# Patient Record
Sex: MALE | Race: NATIVE HAWAIIAN OR OTHER PACIFIC ISLANDER | NOT HISPANIC OR LATINO | Employment: FULL TIME | URBAN - METROPOLITAN AREA
[De-identification: names, ages, dates, MRNs, and addresses within clinical notes are randomized per-mention and may not be internally consistent; named-entity substitution may affect disease eponyms.]

---

## 2023-06-27 ENCOUNTER — OCCUPATIONAL MEDICINE (OUTPATIENT)
Dept: URGENT CARE | Facility: PHYSICIAN GROUP | Age: 28
End: 2023-06-27
Payer: COMMERCIAL

## 2023-06-27 ENCOUNTER — APPOINTMENT (OUTPATIENT)
Dept: RADIOLOGY | Facility: IMAGING CENTER | Age: 28
End: 2023-06-27
Attending: PHYSICIAN ASSISTANT
Payer: COMMERCIAL

## 2023-06-27 VITALS
BODY MASS INDEX: 43.95 KG/M2 | DIASTOLIC BLOOD PRESSURE: 70 MMHG | WEIGHT: 290 LBS | SYSTOLIC BLOOD PRESSURE: 110 MMHG | TEMPERATURE: 97.4 F | RESPIRATION RATE: 16 BRPM | OXYGEN SATURATION: 97 % | HEIGHT: 68 IN | HEART RATE: 74 BPM

## 2023-06-27 DIAGNOSIS — M25.531 RIGHT WRIST PAIN: ICD-10-CM

## 2023-06-27 DIAGNOSIS — S63.502A WRIST SPRAIN, LEFT, INITIAL ENCOUNTER: ICD-10-CM

## 2023-06-27 PROCEDURE — 3078F DIAST BP <80 MM HG: CPT | Performed by: PHYSICIAN ASSISTANT

## 2023-06-27 PROCEDURE — 73110 X-RAY EXAM OF WRIST: CPT | Mod: TC,RT | Performed by: RADIOLOGY

## 2023-06-27 PROCEDURE — 99204 OFFICE O/P NEW MOD 45 MIN: CPT | Performed by: PHYSICIAN ASSISTANT

## 2023-06-27 PROCEDURE — 3074F SYST BP LT 130 MM HG: CPT | Performed by: PHYSICIAN ASSISTANT

## 2023-06-27 RX ORDER — NAPROXEN 500 MG/1
500 TABLET ORAL 2 TIMES DAILY WITH MEALS
Qty: 30 TABLET | Refills: 0 | Status: SHIPPED | OUTPATIENT
Start: 2023-06-27 | End: 2023-07-03

## 2023-06-27 ASSESSMENT — ENCOUNTER SYMPTOMS
FEVER: 0
MYALGIAS: 1
CHILLS: 0
NAUSEA: 0
VOMITING: 0

## 2023-06-27 NOTE — PROGRESS NOTES
"Subjective:   Nico Polanco is a 28 y.o. male who presents for Work-Related Injury (DOA: 6/27/23/Pt was lifting heavy object when pt injured right wrist )       Date of injury: 6/26/2023.  Patient presents for evaluation of right wrist pain.  NORMAN: Patient was lifting heavy load at work and felt sudden pain in the wrist.  Pain is bilateral and on the back of the wrist.  Current pain level 8 out of 10.  Pain is worse with movements-particularly wrist extension and ulnar deviation.  No numbness or tingling in the wrist or hand.  Denies prior injury to the wrist and hand.  Right-hand-dominant.  Patient has been wearing an over-the-counter brace with mild symptomatic relief.      Review of Systems   Constitutional:  Negative for chills and fever.   Gastrointestinal:  Negative for nausea and vomiting.   Musculoskeletal:  Positive for joint pain and myalgias.       PMH:  has no past medical history on file.  MEDS:   Current Outpatient Medications:     naproxen (NAPROSYN) 500 MG Tab, Take 1 Tablet by mouth 2 times a day with meals., Disp: 30 Tablet, Rfl: 0  ALLERGIES: No Known Allergies  SURGHX: History reviewed. No pertinent surgical history.  SOCHX:  reports that he has never smoked. His smokeless tobacco use includes chew.  FH: Family history was reviewed, no pertinent findings to report   Objective:   /70 (BP Location: Right arm, Patient Position: Sitting, BP Cuff Size: Large adult)   Pulse 74   Temp 36.3 °C (97.4 °F) (Temporal)   Resp 16   Ht 1.727 m (5' 8\")   Wt (!) 132 kg (290 lb)   SpO2 97%   BMI 44.09 kg/m²   Physical Exam  Vitals reviewed.   Constitutional:       General: He is not in acute distress.     Appearance: Normal appearance. He is well-developed. He is not toxic-appearing.   HENT:      Head: Normocephalic and atraumatic.      Right Ear: External ear normal.      Left Ear: External ear normal.      Nose: Nose normal.   Cardiovascular:      Rate and Rhythm: Normal rate and regular rhythm. "   Pulmonary:      Effort: Pulmonary effort is normal. No respiratory distress.      Breath sounds: No stridor.   Musculoskeletal:      Comments: Right wrist:.  Skin is intact and atraumatic.  No edema, erythema, ecchymosis.  Patient diffusely tender to palpation over dorsal wrist.  No palpable step-offs.  Extension to 30, flexion to 50.  Radial deviation to 30 and ulnar deviation to 30.  Radial, median, ulnar nerves intact.  Sensation intact to light touch.  Cap refill is brisk and radial pulse 2+.   Skin:     General: Skin is dry.   Neurological:      Comments: Alert and oriented.    Psychiatric:         Speech: Speech normal.         Behavior: Behavior normal.        Imaging:  FINDINGS:     There is normal bony mineralization.  There is no evidence of fracture, dislocation, or osseous lesion.  There is no evidence of soft tissue injury.     IMPRESSION:        1.  No radiographic evidence of acute injury.  Assessment/Plan:   1. Wrist sprain, left, initial encounter  - naproxen (NAPROSYN) 500 MG Tab; Take 1 Tablet by mouth 2 times a day with meals.  Dispense: 30 Tablet; Refill: 0    2. Right wrist pain  - DX-WRIST-COMPLETE 3+ RIGHT; Future    No evidence of acute bony injury on imaging.  Imaging reviewed with patient.  Patient started on naproxen twice daily as needed.  I would like him to wear [facility] wrist brace at all times while working.  Recommend that he elevate and ice as needed for pain as well.  We will see him back next Monday for reevaluation.

## 2023-06-27 NOTE — LETTER
[facility] [place]  [address]  [place] [address]     June 27, 2023    Patient: Nico Polanco   YOB: 1995   Date of Visit: 6/27/2023       To Whom It May Concern:    Nico Polanco was seen and treated in our department on 6/27/2023.  Please excuse him from work today.    Sincerely,     Lizandro Thompson P.A.-C.

## 2023-06-27 NOTE — LETTER
[facility]  [address] - [place] [address]  Phone:  [phone] - Fax:  [phone]   [facility] Progress Report and Disability Certification  Date of Service: 6/27/2023   No Show:  No  Date / Time of Next Visit: 7/3/2023   Claim Information   Patient Name: Nico Polanco  Claim Number:     Employer: [employer] Date of Injury: 6/26/2023     Insurer / TPA:   ID / SSN:     Occupation:  Diagnosis: Diagnoses of Wrist sprain, left, initial encounter and Right wrist pain were pertinent to this visit.    Medical Information   Related to Industrial Injury? Yes   Subjective Complaints:  Date of injury: 6/26/2023.  Patient presents for evaluation of right wrist pain.  NORMAN: Patient was lifting heavy load at work and felt sudden pain in the wrist.  Pain is bilateral and on the back of the wrist.  Current pain level 8 out of 10.  Pain is worse with movements-particularly wrist extension and ulnar deviation.  No numbness or tingling in the wrist or hand.  Denies prior injury to the wrist and hand.  Right-hand-dominant.  Patient has been wearing an over-the-counter brace with mild symptomatic relief.   Objective Findings: Right wrist:.  Skin is intact and atraumatic.  No edema, erythema, ecchymosis.  Patient diffusely tender to palpation over dorsal wrist.  No palpable step-offs.  Extension to 30, flexion to 50.  Radial deviation to 30 and ulnar deviation to 30.  Radial, median, ulnar nerves intact.  Sensation intact to light touch.  Cap refill is brisk and radial pulse 2+.      Pre-Existing Condition(s):     Assessment:   Initial Visit    Status: Additional Care Required  Permanent Disability:No    Plan:      Diagnostics: X-ray    Comments:  No evidence of acute bony injury on imaging.  Imaging reviewed with patient.  Patient started on naproxen twice daily as needed.  I would like him to wear [facility] wrist brace at all times while working.   Recommend that he elevate and ice as needed for pain as well.  We will see him back next Monday for reevaluation.      Disability Information   Status: Released to Restricted Duty    From:  6/27/2023  Through: 7/3/2023 Restrictions are: Temporary   Physical Restrictions   Sitting:    Standing:    Stooping:    Bending:      Squatting:    Walking:    Climbing:    Pushing:      Pulling:    Other:    Reaching Above Shoulder (L):   Reaching Above Shoulder (R):       Reaching Below Shoulder (L):    Reaching Below Shoulder (R):      Not to exceed Weight Limits   Carrying(hrs):   Weight Limit(lb): < or = to 10 pounds Lifting(hrs):   Weight  Limit(lb): < or = to 10 pounds   Comments: Restrictions applied to right hand only.  Patient should wear brace at all times while working.    Repetitive Actions   Hands: i.e. Fine Manipulations from Grasping: < or = to 1 hr/day   Feet: i.e. Operating Foot Controls:     Driving / Operate Machinery:     Health Care Provider’s Original or Electronic Signature  Lizandro Thompson P.A.-C. Health Care Provider’s Original or Electronic Signature    Donavon Ahn DO [facility]     Clinic Name / Location: [address]  [place] [address] Clinic Phone Number: Dept: [phone]   Appointment Time: 3:45 Pm Visit Start Time: 3:56 PM   Check-In Time:  3:53 Pm Visit Discharge Time: 4:47 PM   Original-Treating Physician or Chiropractor    Page 2-Insurer/TPA    Page 3-Employer    Page 4-Employee

## 2023-06-27 NOTE — LETTER
"EMPLOYEE’S CLAIM FOR COMPENSATION/ REPORT OF INITIAL TREATMENT  FORM C-4  PLEASE TYPE OR PRINT    EMPLOYEE’S CLAIM - PROVIDE ALL INFORMATION REQUESTED   First Name  Nico Last Name  Sherri Birthdate                    1995                Sex  male Claim Number (Insurer’s Use Only)   Home Address  [address] Age  28 y.o. Height  1.727 m (5' 8\") Weight  (!) 132 kg (290 lb) [facility]     [place] Zip  [address] Telephone  [phone] (home)    Mailing Address  [address] Zip  [address] Primary Language Spoken  English    INSURER   THIRD-PARTY         Employee's Occupation (Job Title) When Injury or Occupational Disease Occurred      Employer's Name/Company Name  [employer]  Telephone  [phone]    Office Mail Address (Number and Street)  [address]        Date of Injury  6/26/2023               Hours Injury  7:00 PM Date Employer Notified  6/26/2023 Last Day of Work after Injury or Occupational Disease  6/26/2023 Supervisor to Whom Injury     Reported  [place]   Address or Location of Accident (if applicable)  Work [1]   What were you doing at the time of accident? (if applicable)  Picking order    How did this injury or occupational disease occur? (Be specific and answer in detail. Use additional sheet if necessary)  Picking up gatoraid at a fast pace   If you believe that you have an occupational disease, when did you first have knowledge of the disability and its relationship to your employment?  N/A Witnesses to the Accident (if applicable)  N/A      Nature of Injury or Occupational Disease  Sprain  Part(s) of Body Injured or Affected  Wrist (R) and Hand (R), N/A, N/A    I CERTIFY THAT THE ABOVE IS TRUE AND CORRECT TO T HE BEST OF MY KNOWLEDGE AND THAT I HAVE PROVIDED THIS INFORMATION IN ORDER TO OBTAIN THE BENEFITS OF [facility] INDUSTRIAL INSURANCE " AND OCCUPATIONAL DISEASES ACTS [place], OR CHAPTER 617 [place]).  I HEREBY AUTHORIZE ANY PHYSICIAN, CHIROPRACTOR, SURGEON, PRACTITIONER OR ANY OTHER PERSON, ANY HOSPITAL, INCLUDING [facility] OR [facility], ANY  MEDICAL SERVICE ORGANIZATION, ANY INSURANCE COMPANY, OR OTHER INSTITUTION OR ORGANIZATION TO RELEASE TO EACH OTHER, ANY MEDICAL OR OTHER INFORMATION, INCLUDING BENEFITS PAID OR PAYABLE, PERTINENT TO THIS INJURY OR DISEASE, EXCEPT INFORMATION RELATIVE TO DIAGNOSIS, TREATMENT AND/OR COUNSELING FOR AIDS, PSYCHOLOGICAL CONDITIONS, ALCOHOL OR CONTROLLED SUBSTANCES, FOR WHICH I MUST GIVE SPECIFIC AUTHORIZATION.  A PHOTOSTAT OF THIS AUTHORIZATION SHALL BE VALID AS THE ORIGINAL.       Date 6/27/2023   [facility] Employee’s Original or  *Electronic Signature   THIS REPORT MUST BE COMPLETED AND MAILED WITHIN 3 WORKING DAYS OF TREATMENT   Place  [facility]  Name of Facility  [facility]   Date  6/27/2023 Diagnosis and Description of Injury or Occupational Disease  (S63.502A) Wrist sprain, left, initial encounter  (M25.531) Right wrist pain Is there evidence that the injured employee was under the influence of alcohol and/or another controlled substance at the time of accident?  ? No ? Yes (if yes, please explain)   Hour  3:56 PM   Diagnoses of Wrist sprain, left, initial encounter and Right wrist pain were pertinent to this visit. No   Treatment  Patient started on naproxen twice daily as needed.  I would like him to wear OTC wrist brace at all times while working.  Recommend that he elevate and ice as needed for pain as well.  We will see him back next Monday for reevaluation.   Have you advised the patient to remain off work five days or     more?    X-Ray Findings  Negative   ? Yes Indicate dates:   From   To      From information given by the employee, together with medical evidence, can        you directly connect this injury or occupational  "disease as job incurred?  Yes ? No If no, is the injured employee capable of:  ? full duty  No ? modified duty  Yes   Is additional medical care by a physician indicated?  Yes If modified duty, specify any limitations / restrictions  Restricted use of the right hand and 10 pound weight restriction for right hand.   Do you know of any previous injury or disease contributing to this condition or occupational disease?  ? Yes ? No (Explain if yes)                          No   Date  6/27/2023 Print Health Care Provider's  Name  Broderick Ellis P.A.-C. I certify that the employer’s copy of  this form was delivered to the employer on:   Address  [address] [place] [address]. [address] Insurer’s Use Only     [facility] Zip  [address]    Provider’s Tax ID Number  [id number] Telephone  Dept: [phone]             Health Care Provider’s Original or Electronic Signature  e-SignBRODERICK ELLIS P.A.-C. Degree (MD,DO, DC,PANickyC,APRN)  PAEDILMA      * Complete and attach Release of Information (Form C-4A) when injured employee signs C-4 Form electronically  ORIGINAL - TREATING HEALTHCARE PROVIDER PAGE 2 - INSURER/TPA PAGE 3 - EMPLOYER PAGE 4 - EMPLOYEE             Form C-4 (rev.08/21)           BRIEF DESCRIPTION OF RIGHTS AND BENEFITS  (Pursuant to [place] 616C.050)    Notice of Injury or Occupational Disease (Incident Report Form C-1): If an injury or occupational disease (OD) arises out of and in the course of employment, you must provide written notice to your employer as soon as practicable, but no later than 7 days after the accident or OD. Your employer shall maintain a sufficient supply of the required forms.    Claim for Compensation (Form C-4): If medical treatment is sought, the form C-4 is available at the place of initial treatment. A completed \"Claim for Compensation\" (Form C-4) must be filed within 90 days after an accident or OD. The treating physician or chiropractor must, within 3 working days after treatment, complete and mail to " the employer, the employer's insurer and third-party , the Claim for Compensation.    Medical Treatment: If you require medical treatment for your on-the-job injury or OD, you may be required to select a physician or chiropractor from a list provided by your workers’ compensation insurer, if it has contracted with an Organization for Managed Care (MCO) or Preferred Provider Organization (PPO) or providers of health care. If your employer has not entered into a contract with an MCO or PPO, you may select a physician or chiropractor from the Panel of Physicians and Chiropractors. Any medical costs related to your industrial injury or OD will be paid by your insurer.    Temporary Total Disability (TTD): If your doctor has certified that you are unable to work for a period of at least 5 consecutive days, or 5 cumulative days in a 20-day period, or places restrictions on you that your employer does not accommodate, you may be entitled to TTD compensation.    Temporary Partial Disability (TPD): If the wage you receive upon reemployment is less than the compensation for TTD to which you are entitled, the insurer may be required to pay you TPD compensation to make up the difference. TPD can only be paid for a maximum of 24 months.    Permanent Partial Disability (PPD): When your medical condition is stable and there is an indication of a PPD as a result of your injury or OD, within 30 days, your insurer must arrange for an evaluation by a rating physician or chiropractor to determine the degree of your PPD. The amount of your PPD award depends on the date of injury, the results of the PPD evaluation, your age and wage.    Permanent Total Disability (PTD): If you are medically certified by a treating physician or chiropractor as permanently and totally disabled and have been granted a PTD status by your insurer, you are entitled to receive monthly benefits not to exceed 66 2/3% of your average monthly wage. The  amount of your PTD payments is subject to reduction if you previously received a lump-sum PPD award.    Vocational Rehabilitation Services: You may be eligible for vocational rehabilitation services if you are unable to return to the job due to a permanent physical impairment or permanent restrictions as a result of your injury or occupational disease.    Transportation and Per Mikey Reimbursement: You may be eligible for travel expenses and per mikey associated with medical treatment.    Reopening: You may be able to reopen your claim if your condition worsens after claim closure.     Appeal Process: If you disagree with a written determination issued by the insurer or the insurer does not respond to your request, you may appeal to the Department of Administration, , by following the instructions contained in your determination letter. You must appeal the determination within 70 days from the date of the determination letter at [address], [address], [place], [place] [address], or [address], [address], [place] [address]. If you disagree with the  decision, you may appeal to the Department of Administration, . You must file your appeal within 30 days from the date of the  decision letter at [address], [address], [place], [place] [address], or [address], [address], [place] [address]. If you disagree with a decision of an , you may file a petition for judicial review with the District Court. You must do so within 30 days of the Appeal Officer’s decision. You may be represented by an  at your own expense or you may contact the [facility] for possible representation.    [place]  for Injured Workers (NAIW): If you disagree with a  decision, you may request that NAIW represent you without charge at an  Hearing. For information regarding denial of benefits,  you may contact the [facility] at: [address], [address], [place] [address], [phone], or [address], [address], [place] [address], [phone]    To File a Complaint with the Division: If you wish to file a complaint with the  of the Division of Industrial Relations (DIR),  please contact the Workers’ Compensation Section, [address], [address], [place], [place] [address], telephone [phone]) [phone], or [address], [address], [place], [place] [address], telephone [phone].    For assistance with Workers’ Compensation Issues: You may contact the [facility] Office for Consumer Health Assistance, [address], [address], [place] [address], Toll Free 1-462.478.5060, Web site: http://[facility].[place].gov/Programs/AHMET E-mail: ahmet@[email or web address].[place].[facility]              __________________________________________________________________                                    _________________            Employee Name / Signature                                                                                                                            Date                                                                                                                                                                                                                              D-2 (rev. 10/20)

## 2023-07-03 ENCOUNTER — OCCUPATIONAL MEDICINE (OUTPATIENT)
Dept: URGENT CARE | Facility: PHYSICIAN GROUP | Age: 28
End: 2023-07-03
Payer: COMMERCIAL

## 2023-07-03 ENCOUNTER — APPOINTMENT (OUTPATIENT)
Dept: URGENT CARE | Facility: PHYSICIAN GROUP | Age: 28
End: 2023-07-03
Payer: COMMERCIAL

## 2023-07-03 VITALS
RESPIRATION RATE: 20 BRPM | HEIGHT: 68 IN | TEMPERATURE: 97.2 F | SYSTOLIC BLOOD PRESSURE: 118 MMHG | BODY MASS INDEX: 43.95 KG/M2 | DIASTOLIC BLOOD PRESSURE: 68 MMHG | OXYGEN SATURATION: 99 % | HEART RATE: 71 BPM | WEIGHT: 290 LBS

## 2023-07-03 DIAGNOSIS — S63.501A WRIST SPRAIN, RIGHT, INITIAL ENCOUNTER: ICD-10-CM

## 2023-07-03 PROCEDURE — 3074F SYST BP LT 130 MM HG: CPT | Performed by: FAMILY MEDICINE

## 2023-07-03 PROCEDURE — 3078F DIAST BP <80 MM HG: CPT | Performed by: FAMILY MEDICINE

## 2023-07-03 PROCEDURE — 99213 OFFICE O/P EST LOW 20 MIN: CPT | Performed by: FAMILY MEDICINE

## 2023-07-03 NOTE — PROGRESS NOTES
"Subjective:     Nico Polanco is a 28 y.o. male who presents for Work-Related Injury (DOI 06/26/2023 right wrist)    HPI  Pt presents for follow-up  Date of injury: 6/26/2023.  Patient presents for evaluation of right wrist pain.  NORMAN: Patient was lifting heavy load at work and felt sudden pain in the wrist.  Seen in urgent care day after the injury and had x-rays which did not show acute fracture.  Started on naproxen and advised to use a wrist brace  Has been making improvements since last visit  Has been wearing wrist brace at work and feels that it helps  Feels the medicine is helping as well  Pain continues to be along the dorsal wrist  No new falls or injuries since last visit    ROS  PMH: Past medical history reviewed in Epic  MEDS: Medications were reviewed in Epic  ALLERGIES: Allergies were reviewed in Epic     Objective:   /68 (BP Location: Right arm, Patient Position: Sitting, BP Cuff Size: Large adult)   Pulse 71   Temp 36.2 °C (97.2 °F) (Temporal)   Resp 20   Ht 1.727 m (5' 8\")   Wt (!) 132 kg (290 lb)   SpO2 99%   BMI 44.09 kg/m²     Physical Exam    Right wrist/hand  General: no gross deformity, ecchymosis, or erythema  Palpation: TTP mildly along the dorsal wrist  ROM: FROM  Strength: 5/5 throughout   Neuro: median, radial, ulnar nerves intact on testing  Vascular: radial, ulnar pulses 2+ and symmetric, cap refill <2 sec    Assessment/Plan:   Assessment    1. Wrist sprain, left, initial encounter    Patient making some improvements.  We will advance his work restrictions and now allow him to lift up to 20 pounds with right upper extremity.  Reviewed other supportive care measures and will follow-up in about a week to monitor progress.    "

## 2023-07-03 NOTE — LETTER
"EMPLOYEE’S CLAIM FOR COMPENSATION/ REPORT OF INITIAL TREATMENT  FORM C-4  PLEASE TYPE OR PRINT    EMPLOYEE’S CLAIM - PROVIDE ALL INFORMATION REQUESTED   First Name  Nico Last Name  Sherri Birthdate                    1995                Sex  male Claim Number (Insurer’s Use Only)   Home Address  [address] Age  28 y.o. Height  1.727 m (5' 8\") Weight  (!) 132 kg (290 lb) [facility]     [place] Zip  [address] Telephone  [phone] (home)    Mailing Address  [address] Zip  [address] Primary Language Spoken  English    INSURER   THIRD-PARTY     [employer]   Employee's Occupation (Job Title) When Injury or Occupational Disease Occurred      Employer's Name/Company Name  [employer]  Telephone  [phone]    Office Mail Address (Number and Street)  [address]        Date of Injury  6/26/2023               Hours Injury  7:00 PM Date Employer Notified  6/26/2023 Last Day of Work after Injury or Occupational Disease  6/26/2023 Supervisor to Whom Injury     Reported  Angelito   Address or Location of Accident (if applicable)  Work [1]   What were you doing at the time of accident? (if applicable)  Picking order    How did this injury or occupational disease occur? (Be specific and answer in detail. Use additional sheet if necessary)  Picking up gatoraid at a fast pace   If you believe that you have an occupational disease, when did you first have knowledge of the disability and its relationship to your employment?  N/A Witnesses to the Accident (if applicable)  N/A      Nature of Injury or Occupational Disease  Sprain  Part(s) of Body Injured or Affected  Wrist (R) and Hand (R), N/A, N/A    I CERTIFY THAT THE ABOVE IS TRUE AND CORRECT TO T HE BEST OF MY KNOWLEDGE AND THAT I HAVE PROVIDED THIS INFORMATION IN ORDER TO OBTAIN THE BENEFITS OF [place], OR CHAPTER [place]).  I HEREBY AUTHORIZE ANY PHYSICIAN, CHIROPRACTOR, SURGEON, PRACTITIONER OR ANY OTHER PERSON, ANY HOSPITAL, INCLUDING [facility], ANY  MEDICAL SERVICE ORGANIZATION, ANY INSURANCE COMPANY, OR OTHER INSTITUTION OR ORGANIZATION TO RELEASE TO EACH OTHER, ANY MEDICAL OR OTHER INFORMATION, INCLUDING BENEFITS PAID OR PAYABLE, PERTINENT TO THIS INJURY OR DISEASE, EXCEPT INFORMATION RELATIVE TO DIAGNOSIS, TREATMENT AND/OR COUNSELING FOR AIDS, PSYCHOLOGICAL CONDITIONS, ALCOHOL OR CONTROLLED SUBSTANCES, FOR WHICH I MUST GIVE SPECIFIC AUTHORIZATION.  A PHOTOSTAT OF THIS AUTHORIZATION SHALL BE VALID AS THE ORIGINAL.       Date   Place Employee’s Original or  *Electronic Signature   THIS REPORT MUST BE COMPLETED AND MAILED WITHIN 3 WORKING DAYS OF TREATMENT   Place  [facility]  Name of Facility  [place]   Date  7/3/2023 Diagnosis and Description of Injury or Occupational Disease  (S63.501A) Wrist sprain, right, initial encounter Is there evidence that the injured employee was under the influence of alcohol and/or another controlled substance at the time of accident?  ? No ? Yes (if yes, please explain)   Hour  4:16 PM   The encounter diagnosis was Wrist sprain, right, initial encounter.     Treatment     Have you advised the patient to remain off work five days or     more?    X-Ray Findings      ? Yes Indicate dates:   From   To      From information given by the employee, together with medical evidence, can        you directly connect this injury or occupational disease as job incurred?    ? No If no, is the injured employee capable of:  ? full duty    ? modified duty      Is additional medical care by a physician indicated?    If modified duty, specify any limitations / restrictions      Do you know of any previous injury or disease contributing to this condition  "or occupational disease?  ? Yes ? No (Explain if yes)                              Date  7/3/2023 Print Health Care Provider's  Name  Ivette Alvarez M.D. I certify that the employer’s copy of  this form was delivered to the employer on:   Address  [address] [place] [address]. [address] Insurer’s Use Only     [facility] Zip  [address]    Provider’s Tax ID Number  [id number] Telephone  Dept: [phone]             Health Care Provider’s Original or Electronic Signature  e-IVETTE Waite M.D. Degree (MD,DO, DC,PA-C,APRN)  MD      * Complete and attach Release of Information (Form C-4A) when injured employee signs C-4 Form electronically  ORIGINAL - TREATING HEALTHCARE PROVIDER PAGE 2 - INSURER/TPA PAGE 3 - EMPLOYER PAGE 4 - EMPLOYEE             Form C-4 (rev.08/21)           BRIEF DESCRIPTION OF RIGHTS AND BENEFITS  (Pursuant to [place] 616[place].050)    Notice of Injury or Occupational Disease (Incident Report Form C-1): If an injury or occupational disease (OD) arises out of and in the course of employment, you must provide written notice to your employer as soon as practicable, but no later than 7 days after the accident or OD. Your employer shall maintain a sufficient supply of the required forms.    Claim for Compensation (Form C-4): If medical treatment is sought, the form C-4 is available at the place of initial treatment. A completed \"Claim for Compensation\" (Form C-4) must be filed within 90 days after an accident or OD. The treating physician or chiropractor must, within 3 working days after treatment, complete and mail to the employer, the employer's insurer and third-party , the Claim for Compensation.    Medical Treatment: If you require medical treatment for your on-the-job injury or OD, you may be required to select a physician or chiropractor from a list provided by your workers’ compensation insurer, if it has contracted with an Organization for Managed Care (MCO) or Preferred " Provider Organization (PPO) or providers of health care. If your employer has not entered into a contract with an MCO or PPO, you may select a physician or chiropractor from the Panel of Physicians and Chiropractors. Any medical costs related to your industrial injury or OD will be paid by your insurer.    Temporary Total Disability (TTD): If your doctor has certified that you are unable to work for a period of at least 5 consecutive days, or 5 cumulative days in a 20-day period, or places restrictions on you that your employer does not accommodate, you may be entitled to TTD compensation.    Temporary Partial Disability (TPD): If the wage you receive upon reemployment is less than the compensation for TTD to which you are entitled, the insurer may be required to pay you TPD compensation to make up the difference. TPD can only be paid for a maximum of 24 months.    Permanent Partial Disability (PPD): When your medical condition is stable and there is an indication of a PPD as a result of your injury or OD, within 30 days, your insurer must arrange for an evaluation by a rating physician or chiropractor to determine the degree of your PPD. The amount of your PPD award depends on the date of injury, the results of the PPD evaluation, your age and wage.    Permanent Total Disability (PTD): If you are medically certified by a treating physician or chiropractor as permanently and totally disabled and have been granted a PTD status by your insurer, you are entitled to receive monthly benefits not to exceed 66 2/3% of your average monthly wage. The amount of your PTD payments is subject to reduction if you previously received a lump-sum PPD award.    Vocational Rehabilitation Services: You may be eligible for vocational rehabilitation services if you are unable to return to the job due to a permanent physical impairment or permanent restrictions as a result of your injury or occupational disease.    Transportation and Per  Mikey Reimbursement: You may be eligible for travel expenses and per mikey associated with medical treatment.    Reopening: You may be able to reopen your claim if your condition worsens after claim closure.     Appeal Process: If you disagree with a written determination issued by the insurer or the insurer does not respond to your request, you may appeal to the Department of Administration, , by following the instructions contained in your determination letter. You must appeal the determination within 70 days from the date of the determination letter at [address], [address], [place], [place] [address], or [address], [address], [place], [place] [address]. If you disagree with the  decision, you may appeal to the Department of Administration, . You must file your appeal within 30 days from the date of the  decision letter at [address], [address], [place], [place] [address], or [address], [address], [place], [place] [address]. If you disagree with a decision of an , you may file a petition for judicial review with the District Court. You must do so within 30 days of the Appeal Officer’s decision. You may be represented by an  at your own expense or you may contact the [facility] for possible representation.    [place]  for Injured Workers (NAIW): If you disagree with a  decision, you may request that NAIW represent you without charge at an  Hearing. For information regarding denial of benefits, you may contact the [facility] at: [address], [address], [place] [address], [phone], or [address], [address], [place] [address], [phone]    To File a Complaint with the Division: If you wish to file a complaint with the  of the Division of Industrial Relations (DIR),  please contact the Workers’ Compensation Section, [facility]  [address], [address], [place], [place] [address], telephone (701) [phone], or [address], [address], [place], [place] [address], telephone [phone].    For assistance with Workers’ Compensation Issues: You may contact the [facility] Office for Consumer Health Assistance, [address], [address], [place], [place] [address], Toll Free [phone], Web site: http://[facility].[place].gov/Programs/WHIT E-mail: whit@[facility].nv.gov              __________________________________________________________________                                    _________________            Employee Name / Signature                                                                                                                            Date                                                                                                                                                                                                                              D-2 (rev. 10/20)

## 2023-07-03 NOTE — LETTER
[facility]  [address] [place] [address]. [address] - [place] [address]  Phone:  [phone] - Fax:  [phone]   [facility] Progress Report and Disability Certification  Date of Service: 7/3/2023   No Show:  No  Date / Time of Next Visit: 7/11/2023   Claim Information   Patient Name: Nico Polanco  Claim Number:     Employer: [employer] Date of Injury: 6/26/2023     Insurer / TPA: [employer] ID / SSN:     Occupation:  Diagnosis: The encounter diagnosis was Wrist sprain, right, initial encounter.    Medical Information   Related to Industrial Injury? Yes   Subjective Complaints:  Pt presents for follow-up  Date of injury: 6/26/2023.  Patient presents for evaluation of right wrist pain.  NORMAN: Patient was lifting heavy load at work and felt sudden pain in the wrist.  Seen in urgent care day after the injury and had x-rays which did not show acute fracture.  Started on naproxen and advised to use a wrist brace  Has been making improvements since last visit  Has been wearing wrist brace at work and feels that it helps  Feels the medicine is helping as well  Pain continues to be along the dorsal wrist  No new falls or injuries since last visit   Objective Findings: Right wrist/hand  General: no gross deformity, ecchymosis, or erythema  Palpation: TTP mildly along the dorsal wrist  ROM: FROM  Strength: 5/5 throughout   Neuro: median, radial, ulnar nerves intact on testing  Vascular: radial, ulnar pulses 2+ and symmetric, cap refill <2 sec   Pre-Existing Condition(s):     Assessment:   Condition Improved    Status: Additional Care Required  Permanent Disability:No    Plan:      Diagnostics:      Comments:       Disability Information   Status: Released to Restricted Duty    From:  7/3/2023  Through: 7/11/2023 Restrictions are: Temporary   Physical Restrictions   Sitting:    Standing:    Stooping:    Bending:      Squatting:    Walking:     Climbing:    Pushing:      Pulling:    Other:    Reaching Above Shoulder (L):   Reaching Above Shoulder (R):       Reaching Below Shoulder (L):    Reaching Below Shoulder (R):      Not to exceed Weight Limits   Carrying(hrs):   Weight Limit(lb):   Lifting(hrs):   Weight  Limit(lb):     Comments: Limit right upper extremity lifting to a maximum of 20 pounds    Repetitive Actions   Hands: i.e. Fine Manipulations from Grasping:     Feet: i.e. Operating Foot Controls:     Driving / Operate Machinery:     Health Care Provider’s Original or Electronic Signature  Prabhakar Alvarez M.D. Health Care Provider’s Original or Electronic Signature    Donavon Ahn DO MPH     Clinic Name / Location: [address]  [place] [address] Clinic Phone Number: Dept: [phone]   Appointment Time: 3:25 Pm Visit Start Time: 4:16 PM   Check-In Time:  3:24 Pm Visit Discharge Time: 4:45 PM   Original-Treating Physician or Chiropractor    Page 2-Insurer/TPA    Page 3-Employer    Page 4-Employee

## 2023-07-03 NOTE — LETTER
July 3, 2023    To Whom It May Concern:         This is confirmation that Nico Sherri attended his scheduled appointment with Prabhakar Alvarez M.D. on 7/03/23.  Please excuse him from time missed from work today.         If you have any questions please do not hesitate to call me at the phone number listed below.    Sincerely,          Prabhakar Alvarez M.D.  [phone]

## 2023-07-11 ENCOUNTER — OCCUPATIONAL MEDICINE (OUTPATIENT)
Dept: URGENT CARE | Facility: PHYSICIAN GROUP | Age: 28
End: 2023-07-11
Payer: COMMERCIAL

## 2023-07-11 VITALS
HEIGHT: 68 IN | DIASTOLIC BLOOD PRESSURE: 62 MMHG | RESPIRATION RATE: 18 BRPM | TEMPERATURE: 97.8 F | WEIGHT: 290 LBS | SYSTOLIC BLOOD PRESSURE: 118 MMHG | BODY MASS INDEX: 43.95 KG/M2 | OXYGEN SATURATION: 100 % | HEART RATE: 81 BPM

## 2023-07-11 DIAGNOSIS — M25.531 RIGHT WRIST PAIN: ICD-10-CM

## 2023-07-11 DIAGNOSIS — S63.501A WRIST SPRAIN, RIGHT, INITIAL ENCOUNTER: ICD-10-CM

## 2023-07-11 PROCEDURE — 3078F DIAST BP <80 MM HG: CPT | Performed by: NURSE PRACTITIONER

## 2023-07-11 PROCEDURE — 3074F SYST BP LT 130 MM HG: CPT | Performed by: NURSE PRACTITIONER

## 2023-07-11 PROCEDURE — 99213 OFFICE O/P EST LOW 20 MIN: CPT | Performed by: NURSE PRACTITIONER

## 2023-07-11 ASSESSMENT — ENCOUNTER SYMPTOMS
FEVER: 0
FALLS: 0
MYALGIAS: 1
BRUISES/BLEEDS EASILY: 0
CHILLS: 0
TINGLING: 0
WEAKNESS: 0
SENSORY CHANGE: 0

## 2023-07-11 NOTE — PROGRESS NOTES
"Steven Polanco is a 28 y.o. male who presents with Work-Related Injury (DOI 06/26/2023 right wrist /)      SECOND VISIT NOTE:  Date of injury: 6/26/2023.  Patient presents for evaluation of right wrist pain.  NORMAN: Patient was lifting heavy load at work and felt sudden pain in the wrist.  Seen in urgent care day after the injury and had x-rays which did not show acute fracture.  Started on naproxen and advised to use a wrist brace  Has been making improvements since last visit  Has been wearing wrist brace at work and feels that it helps  Feels the medicine is helping as well  Pain continues to be along the dorsal wrist  No new falls or injuries since last visit    TODAY, 7/11/2023, 3rd encounter.  States has some improvement from last visit.  Will take his naproxen twice daily with meals.  Will wear his wrist splint while at work.  States experiencing stiffness in right wrist joint with flexion.  No ice or heat application at this time.  He is using IcyHot.  States able to make fist but does feel weak in his .     HPI  PMH: No pertinent past medical history to this problem  MEDS: Medications were reviewed in Epic  ALLERGIES: Allergies were reviewed in Epic  FH: No pertinent family history to this problem       Review of Systems   Constitutional:  Negative for chills, fever and malaise/fatigue.   Musculoskeletal:  Positive for joint pain and myalgias. Negative for falls.   Skin:  Negative for itching and rash.   Neurological:  Negative for tingling, sensory change and weakness.   Endo/Heme/Allergies:  Does not bruise/bleed easily.   All other systems reviewed and are negative.             Objective     /62 (BP Location: Right arm, Patient Position: Sitting, BP Cuff Size: Large adult)   Pulse 81   Temp 36.6 °C (97.8 °F) (Temporal)   Resp 18   Ht 1.727 m (5' 8\")   Wt (!) 132 kg (290 lb)   SpO2 100%   BMI 44.09 kg/m²      Physical Exam  Vitals reviewed.   Constitutional:       General: " He is awake. He is not in acute distress.     Appearance: Normal appearance. He is well-developed. He is not ill-appearing, toxic-appearing or diaphoretic.   HENT:      Head: Normocephalic.   Cardiovascular:      Rate and Rhythm: Normal rate.   Pulmonary:      Effort: Pulmonary effort is normal.   Musculoskeletal:      Right wrist: Tenderness present. No swelling, deformity, effusion, lacerations, bony tenderness, snuff box tenderness or crepitus. Normal range of motion. Normal pulse.   Skin:     General: Skin is warm and dry.      Findings: No bruising or erythema.   Neurological:      Mental Status: He is alert and oriented to person, place, and time.   Psychiatric:         Attention and Perception: Attention normal.         Mood and Affect: Mood normal.         Speech: Speech normal.         Behavior: Behavior normal. Behavior is cooperative.         A/O x 3. Skin p/w/d, skin sensation intact. Vitals WNL.  Full range of motion with flexion and extension but does have some discomfort with flexion.  No swelling, erythema, bruising or deformity of right wrist joint.  Able to make fist, strength 5 out of 5.  Tenderness on palpation of ulnar side of wrist joint.  CRT <2 seconds.                   Assessment & Plan        1. Right wrist pain      2. Wrist sprain, right, initial encounter       Work restriction: no lift/carry > 20 pounds   -Trial job duties without wrist splint, but recommend to use if pain occurs   -May continue take naproxen as needed for pain   -May apply cool compress for any throbbing pain or swelling as needed   -May use warm compress for any joint stiffness with IcyHot topical with massage   -May perform gentle range of motion exercises to prevent joint stiffness   -Recheck in 5 days

## 2023-07-11 NOTE — LETTER
July 11, 2023       Patient: Nico Polanco   YOB: 1995   Date of Visit: 7/11/2023         To Whom It May Concern:    In my medical opinion, I recommend that Nico Polanco be excused from work today as he was seen in clinic.    If you have any questions or concerns, please don't hesitate to call [phone]          Sincerely,          HANNA German.  Electronically Signed

## 2023-07-11 NOTE — LETTER
[facility]  [address] [place] [address]. [address] - [place] [address]  Phone:  [phone] - Fax:  [phone]   [facility] Progress Report and Disability Certification  Date of Service: 7/11/2023   No Show:  No  Date / Time of Next Visit: 7/16/2023   Claim Information   Patient Name: Nico Polanco  Claim Number:     Employer: [employer]  Date of Injury: 6/26/2023     Insurer / TPA: [employer]  ID / SSN:     Occupation:   Diagnosis: Diagnoses of Right wrist pain and Wrist sprain, right, initial encounter were pertinent to this visit.    Medical Information   Related to Industrial Injury? Yes    Subjective Complaints:  SECOND VISIT NOTE:  Date of injury: 6/26/2023.  Patient presents for evaluation of right wrist pain.  NORMAN: Patient was lifting heavy load at work and felt sudden pain in the wrist.  Seen in urgent care day after the injury and had x-rays which did not show acute fracture.  Started on naproxen and advised to use a wrist brace  Has been making improvements since last visit  Has been wearing wrist brace at work and feels that it helps  Feels the medicine is helping as well  Pain continues to be along the dorsal wrist  No new falls or injuries since last visit    TODAY, 7/11/2023, 3rd encounter.  States has some improvement from last visit.  Will take his naproxen twice daily with meals.  Will wear his wrist splint while at work.  States experiencing stiffness in right wrist joint with flexion.  No ice or heat application at this time.  He is using IcyHot.  States able to make fist but does feel weak in his .   Objective Findings: A/O x 3. Skin p/w/d, skin sensation intact. Vitals WNL.  Full range of motion with flexion and extension but does have some discomfort with flexion.  No swelling, erythema, bruising or deformity of right wrist joint.  Able to make fist, strength 5 out of 5.  Tenderness on palpation of ulnar side  of wrist joint.  CRT <2 seconds.   Pre-Existing Condition(s):     Assessment:   Condition Improved    Status: Additional Care Required  Permanent Disability:No    Plan:      Diagnostics:      Comments:       Disability Information   Status: Released to Restricted Duty    From:  7/11/2023  Through: 7/16/2023 Restrictions are:     Physical Restrictions   Sitting:    Standing:    Stooping:    Bending:      Squatting:    Walking:    Climbing:    Pushing:      Pulling:    Other:    Reaching Above Shoulder (L):   Reaching Above Shoulder (R):       Reaching Below Shoulder (L):    Reaching Below Shoulder (R):      Not to exceed Weight Limits   Carrying(hrs):   Weight Limit(lb):   Lifting(hrs):   Weight  Limit(lb):     Comments: Work restriction: no lift/carry > 20 pounds  -Trial job duties without wrist splint, but recommend to use if pain occurs  -May continue take naproxen as needed for pain  -May apply cool compress for any throbbing pain or swelling as needed  -May use warm compress for any joint stiffness with IcyHot topical with massage  -May perform gentle range of motion exercises to prevent joint stiffness  -Recheck in 5 days    Repetitive Actions   Hands: i.e. Fine Manipulations from Grasping:     Feet: i.e. Operating Foot Controls:     Driving / Operate Machinery:     Health Care Provider’s Original or Electronic Signature  LEO GermanRRiteshN. Health Care Provider’s Original or Electronic Signature    Donavon Ahn DO MPH     Clinic Name / Location: [address]  [place] [address] Clinic Phone Number: Dept: [phone]   Appointment Time: 1:00 Pm Visit Start Time: 1:06 PM   Check-In Time:  12:43 Pm Visit Discharge Time:     Original-Treating Physician or Chiropractor    Page 2-Insurer/TPA    Page 3-Employer    Page 4-Employee

## 2023-07-11 NOTE — LETTER
[facility]  [address] [place] [address]. [address] - [place] [address]  Phone:  [phone] - Fax:  [phone]   [facility] Progress Report and Disability Certification  Date of Service: 7/11/2023   No Show:  No  Date / Time of Next Visit: 7/16/2023   Claim Information   Patient Name: Nico Polanco  Claim Number:     Employer: [employer] Date of Injury: 6/26/2023     Insurer / TPA: [employer] ID / SSN: [id number]    Occupation:  Diagnosis: Diagnoses of Right wrist pain and Wrist sprain, right, initial encounter were pertinent to this visit.    Medical Information   Related to Industrial Injury? Yes   Subjective Complaints:  SECOND VISIT NOTE:  Date of injury: 6/26/2023.  Patient presents for evaluation of right wrist pain.  NORMAN: Patient was lifting heavy load at work and felt sudden pain in the wrist.  Seen in urgent care day after the injury and had x-rays which did not show acute fracture.  Started on naproxen and advised to use a wrist brace  Has been making improvements since last visit  Has been wearing wrist brace at work and feels that it helps  Feels the medicine is helping as well  Pain continues to be along the dorsal wrist  No new falls or injuries since last visit    TODAY, 7/11/2023, 3rd encounter.  States has some improvement from last visit.  Will take his naproxen twice daily with meals.  Will wear his wrist splint while at work.  States experiencing stiffness in right wrist joint with flexion.  No ice or heat application at this time.  He is using IcyHot.  States able to make fist but does feel weak in his .   Objective Findings: A/O x 3. Skin p/w/d, skin sensation intact. Vitals WNL.  Full range of motion with flexion and extension but does have some discomfort with flexion.  No swelling, erythema, bruising or deformity of right wrist joint.  Able to make fist, strength 5 out of 5.  Tenderness on palpation of ulnar side of  wrist joint.  CRT <2 seconds.   Pre-Existing Condition(s):     Assessment:   Condition Improved    Status: Additional Care Required  Permanent Disability:No    Plan:      Diagnostics:      Comments:       Disability Information   Status: Released to Restricted Duty    From:  7/11/2023  Through: 7/16/2023 Restrictions are:     Physical Restrictions   Sitting:    Standing:    Stooping:    Bending:      Squatting:    Walking:    Climbing:    Pushing:      Pulling:    Other:    Reaching Above Shoulder (L):   Reaching Above Shoulder (R):       Reaching Below Shoulder (L):    Reaching Below Shoulder (R):      Not to exceed Weight Limits   Carrying(hrs):   Weight Limit(lb):   Lifting(hrs):   Weight  Limit(lb):     Comments: Work restriction: no lift/carry > 20 pounds  -Trial job duties without wrist splint, but recommend to use if pain occurs  -May continue take naproxen as needed for pain  -May apply cool compress for any throbbing pain or swelling as needed  -May use warm compress for any joint stiffness with IcyHot topical with massage  -May perform gentle range of motion exercises to prevent joint stiffness  -Recheck in 5 days    Repetitive Actions   Hands: i.e. Fine Manipulations from Grasping:     Feet: i.e. Operating Foot Controls:     Driving / Operate Machinery:     Health Care Provider’s Original or Electronic Signature  LEO GermanRRiteshN. Health Care Provider’s Original or Electronic Signature    Donavon Ahn DO MPH     Clinic Name / Location: [address]  [place] [address] Clinic Phone Number: Dept: [phone]   Appointment Time: 1:00 Pm Visit Start Time: 1:06 PM   Check-In Time:  12:43 Pm Visit Discharge Time: 1:42 PM   Original-Treating Physician or Chiropractor    Page 2-Insurer/TPA    Page 3-Employer    Page 4-Employee

## 2023-07-16 ENCOUNTER — OCCUPATIONAL MEDICINE (OUTPATIENT)
Dept: URGENT CARE | Facility: PHYSICIAN GROUP | Age: 28
End: 2023-07-16
Payer: COMMERCIAL

## 2023-07-16 VITALS
TEMPERATURE: 98.4 F | RESPIRATION RATE: 16 BRPM | SYSTOLIC BLOOD PRESSURE: 122 MMHG | OXYGEN SATURATION: 96 % | HEART RATE: 76 BPM | BODY MASS INDEX: 43.95 KG/M2 | HEIGHT: 68 IN | DIASTOLIC BLOOD PRESSURE: 76 MMHG | WEIGHT: 290 LBS

## 2023-07-16 DIAGNOSIS — S63.501D WRIST SPRAIN, RIGHT, SUBSEQUENT ENCOUNTER: ICD-10-CM

## 2023-07-16 PROCEDURE — 3074F SYST BP LT 130 MM HG: CPT | Performed by: NURSE PRACTITIONER

## 2023-07-16 PROCEDURE — 99213 OFFICE O/P EST LOW 20 MIN: CPT | Performed by: NURSE PRACTITIONER

## 2023-07-16 PROCEDURE — 3078F DIAST BP <80 MM HG: CPT | Performed by: NURSE PRACTITIONER

## 2023-07-16 ASSESSMENT — ENCOUNTER SYMPTOMS
NEUROLOGICAL NEGATIVE: 1
CONSTITUTIONAL NEGATIVE: 1

## 2023-07-16 ASSESSMENT — VISUAL ACUITY: OU: 1

## 2023-07-16 NOTE — LETTER
"   [facility] [address]. [address] - [place] [address]  Phone:  [phone] - Fax:  [phone]   [facility] Progress Report and Disability Certification  Date of Service: 7/16/2023   No Show:  No  Date / Time of Next Visit: 7/24/2023   Claim Information   Patient Name: Nico Polanco  Claim Number:     Employer: [employer] Date of Injury: 6/26/2023     Insurer / TPA: [employer] ID / SSN:     Occupation:  Diagnosis: The encounter diagnosis was Wrist sprain, right, subsequent encounter.    Medical Information   Related to Industrial Injury? Yes   Subjective Complaints:  Initial UC visit 6/27/2023 reviewed for continuity of care:    \"Date of injury: 6/26/2023.  Patient presents for evaluation of right wrist pain.  NORMAN: Patient was lifting heavy load at work and felt sudden pain in the wrist.  Pain is bilateral and on the back of the wrist.  Current pain level 8 out of 10.  Pain is worse with movements-particularly wrist extension and ulnar deviation.  No numbness or tingling in the wrist or hand.  Denies prior injury to the wrist and hand.  Right-hand-dominant.  Patient has been wearing an over-the-counter brace with mild symptomatic relief.\"    XR of R wrist negative. Dx: Wrist sprain. Tx: Naproxen. Brace. Ice. Restrictions.    Has followed up 7/3/2023 and 7/11/2023 with interval improvement.    Follow-up UC visit today 7/16/2023:    Patient reports symptoms about 80% improved overall. However, concerned of continued pain at the dorsal aspect of the R wrist, worse with grasping, pulling, lifting, and carrying. Has been following previously set work restrictions, but works 6 days/week 10 hours/day which makes it difficult to rest his wrist. Using splint as needed and continues to use naproxen. No other/new symptoms.   Objective Findings: Constitutional:       General: He is not in acute distress.     Appearance: He is " well-developed. He is not ill-appearing or toxic-appearing.   Musculoskeletal:         General: No deformity. Normal range of motion.      Right wrist: No swelling, deformity, lacerations or tenderness. Normal range of motion.      Right hand: No swelling, deformity, lacerations or tenderness. Normal range of motion. Normal strength. Normal sensation. Normal capillary refill.   Skin:     General: Skin is warm and dry.      Coloration: Skin is not pale.      Findings: No bruising, erythema or rash.   Neurological:      Mental Status: He is alert and oriented to person, place, and time.      Motor: No weakness.    Pre-Existing Condition(s):     Assessment:   Initial Visit    Status: Discharged / Care Transfer  Permanent Disability:No    Plan: Transfer Care    Diagnostics:      Comments:  Rest, ice, compression, and elevation (RICE). Continue naproxen as needed for pain. Updated work restrictions. 4th visit overall. Persistent symptoms. Transfer care to occupational medicine. Follow up within 1 week. Seek immediate medical attention if symptoms change/worsen.    Disability Information   Status: Released to Restricted Duty    From:  7/16/2023  Through: 7/24/2023 Restrictions are: Temporary   Physical Restrictions   Sitting:    Standing:    Stooping:    Bending:      Squatting:    Walking:    Climbing:    Pushing:      Pulling:    Other:    Reaching Above Shoulder (L):   Reaching Above Shoulder (R):       Reaching Below Shoulder (L):    Reaching Below Shoulder (R):      Not to exceed Weight Limits   Carrying(hrs):   Weight Limit(lb):   Lifting(hrs):   Weight  Limit(lb):     Comments: Pushing, pulling, lifting, carrying with right hand limited to 10 lb; must wear wrist brace at all times    Repetitive Actions   Hands: i.e. Fine Manipulations from Grasping:     Feet: i.e. Operating Foot Controls:     Driving / Operate Machinery:     Health Care Provider’s Original or Electronic Signature  HANNA Curtis.  Health Care Provider’s Original or Electronic Signature    Donavon Ahn DO MPH     Clinic Name / Location: [address]  [place] [address] Clinic Phone Number: Dept: [phone]   Appointment Time: 3:00 Pm Visit Start Time: 2:59 PM   Check-In Time:  2:40 Pm Visit Discharge Time: 3:28 PM   Original-Treating Physician or Chiropractor    Page 2-Insurer/TPA    Page 3-Employer    Page 4-Employee

## 2023-07-16 NOTE — PROGRESS NOTES
"Subjective:     Nico Polanco is a 28 y.o. male who presents for Follow-Up (Right wrist followup, feeling good )    Initial UC visit 6/27/2023 reviewed for continuity of care:    \"Date of injury: 6/26/2023.  Patient presents for evaluation of right wrist pain.  NORMAN: Patient was lifting heavy load at work and felt sudden pain in the wrist.  Pain is bilateral and on the back of the wrist.  Current pain level 8 out of 10.  Pain is worse with movements-particularly wrist extension and ulnar deviation.  No numbness or tingling in the wrist or hand.  Denies prior injury to the wrist and hand.  Right-hand-dominant.  Patient has been wearing an over-the-counter brace with mild symptomatic relief.\"    XR of R wrist negative. Dx: Wrist sprain. Tx: Naproxen. Brace. Ice. Restrictions.    Has followed up 7/3/2023 and 7/11/2023 with interval improvement.    Follow-up UC visit today 7/16/2023:    Patient reports symptoms about 80% improved overall. However, concerned of continued pain at the dorsal aspect of the R wrist, worse with grasping, pulling, lifting, and carrying. Has been following previously set work restrictions, but works 6 days/week 10 hours/day which makes it difficult to rest his wrist. Using splint as needed and continues to use naproxen. No other/new symptoms.    Review of Systems   Constitutional: Negative.    Musculoskeletal:         R wrist pain   Neurological: Negative.    All other systems reviewed and are negative.    Refer to HPI for additional details.    During this visit, appropriate PPE was worn and hand hygiene was performed.    PMH: No pertinent past medical history to this problem  MEDS: Medications were reviewed in Epic  ALLERGIES: Allergies were reviewed in Epic  SOCHX: Works as a  at [employer]   FH: No pertinent family history to this problem      Objective:     /76 (BP Location: Right arm, Patient Position: Sitting, BP Cuff Size: Large adult)   Pulse 76   Temp " "36.9 °C (98.4 °F) (Temporal)   Resp 16   Ht 1.727 m (5' 8\")   Wt (!) 132 kg (290 lb)   SpO2 96%   BMI 44.09 kg/m²     Physical Exam  Nursing note reviewed.   Constitutional:       General: He is not in acute distress.     Appearance: He is well-developed. He is not ill-appearing or toxic-appearing.   Eyes:      General: Vision grossly intact.   Cardiovascular:      Rate and Rhythm: Normal rate.   Pulmonary:      Effort: Pulmonary effort is normal. No respiratory distress.   Musculoskeletal:         General: No deformity. Normal range of motion.      Right wrist: No swelling, deformity, lacerations or tenderness. Normal range of motion.      Right hand: No swelling, deformity, lacerations or tenderness. Normal range of motion. Normal strength. Normal sensation. Normal capillary refill.   Skin:     General: Skin is warm and dry.      Coloration: Skin is not pale.      Findings: No bruising, erythema or rash.   Neurological:      Mental Status: He is alert and oriented to person, place, and time.      Motor: No weakness.   Psychiatric:         Behavior: Behavior normal. Behavior is cooperative.       Assessment/Plan:     1. Wrist sprain, right, subsequent encounter  - Referral to Occupational Medicine    Rest, ice, compression, and elevation (RICE). Continue naproxen as needed for pain. Updated work restrictions. 4th visit overall. Persistent symptoms. Transfer care to occupational medicine. Follow up within 1 week. Seek immediate medical attention if symptoms change/worsen.    Differential diagnosis, natural history, supportive care, over-the-counter symptom management per 's instructions, close monitoring, and indications for immediate follow-up discussed.     All questions answered. Patient agrees with the plan of care.    "

## 2023-07-16 NOTE — LETTER
July 16, 2023         Patient: Nico Polanco   YOB: 1995   Date of Visit: 7/16/2023           To Whom it May Concern:    Nico Polanco was seen in my clinic on 7/16/2023 due to illness. Due to medical necessity, please excuse patient from work 7/16/2023.     If you have any questions or concerns, please don't hesitate to call.        Sincerely,           HANNA Curtis.  Electronically Signed

## 2023-07-25 ENCOUNTER — OCCUPATIONAL MEDICINE (OUTPATIENT)
Dept: OCCUPATIONAL MEDICINE | Facility: CLINIC | Age: 28
End: 2023-07-25
Payer: COMMERCIAL

## 2023-07-25 VITALS
SYSTOLIC BLOOD PRESSURE: 126 MMHG | BODY MASS INDEX: 42.06 KG/M2 | HEART RATE: 55 BPM | WEIGHT: 284 LBS | OXYGEN SATURATION: 97 % | HEIGHT: 69 IN | DIASTOLIC BLOOD PRESSURE: 84 MMHG | RESPIRATION RATE: 16 BRPM | TEMPERATURE: 98.9 F

## 2023-07-25 DIAGNOSIS — S63.501D WRIST SPRAIN, RIGHT, SUBSEQUENT ENCOUNTER: ICD-10-CM

## 2023-07-25 PROCEDURE — 1125F AMNT PAIN NOTED PAIN PRSNT: CPT | Performed by: PREVENTIVE MEDICINE

## 2023-07-25 PROCEDURE — 3074F SYST BP LT 130 MM HG: CPT | Performed by: PREVENTIVE MEDICINE

## 2023-07-25 PROCEDURE — 3079F DIAST BP 80-89 MM HG: CPT | Performed by: PREVENTIVE MEDICINE

## 2023-07-25 PROCEDURE — 99203 OFFICE O/P NEW LOW 30 MIN: CPT | Performed by: PREVENTIVE MEDICINE

## 2023-07-25 ASSESSMENT — PAIN SCALES - GENERAL: PAINLEVEL: 3=SLIGHT PAIN

## 2023-07-25 NOTE — PROGRESS NOTES
"Subjective:     Nico Polanco is a 28 y.o. 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  NORMAN: Patient was lifting heavy load at work and felt sudden pain in the wrist. Pain is bilateral and on the back of the wrist.  Seen urgent care x4, recommended NSAIDs and work restrictions.  Patient states that overall he feels about 65% improved from initial injury.  He states that pain is mostly in the right wrist with pain over the ulnar aspect of the wrist.  He states it is worse with frequent use and repetitive lifting.  He states that he does have work restrictions but is frequently asked to do things outside the work restrictions.  He states that at rest pain is minimal.  Denies numbness or tingling.  He states he is using a wrist brace at work which does seem to help.  He states he is starting to get little bit of pain in the left wrist but from using his left wrist more to make up for weakness in the right.    ROS: All systems were reviewed on intake form, form was reviewed and signed. See scanned documents in media. Pertinent positives and negatives included in HPI.    PMH: No pertinent past medical history to this problem  MEDS: Medications were reviewed in Epic  ALLERGIES: No Known Allergies  SOCHX: Works as  at [employer]  FH: No pertinent family history to this problem       Objective:     /84   Pulse (!) 55   Temp 37.2 °C (98.9 °F) (Temporal)   Resp 16   Ht 1.753 m (5' 9\")   Wt (!) 129 kg (284 lb)   SpO2 97%   BMI 41.94 kg/m²     Constitutional: Patient is in no acute distress. Appears well-developed and well-nourished.   HENT: Normocephalic and atraumatic. EOM are normal. No scleral icterus.   Cardiovascular: Normal rate.    Pulmonary/Chest: Effort normal. No respiratory distress.   Neurological: Patient is alert and oriented to person, place, and time.   Skin: Skin is warm and dry. "   Psychiatric: Normal mood and affect. Behavior is normal.     Right wrist: No gross deformity.  No significant tenderness.  Full range of motion with mild discomfort.  Some slight weakness with  strength, resisted wrist flexion/extension and supination.  Painful with resisted movements.  Finkelstein's negative.  Tinel's negative.    Assessment/Plan:       1. Wrist sprain, right, subsequent encounter  - Referral to Hand Therapy    Released to Restricted Duty FROM 7/25/2023 TO 8/14/2023  Limit right hand to less than 10 pounds lift/push/pull.  Given continued symptoms recommend continued conservative management  Referral to hand therapy  OTC ibuprofen 600 mg 3 times daily as needed  Continue wrist brace as needed  Okay to use OTC muscle creams/ointments as needed  Restricted duty  Follow-up 3 weeks    Differential diagnosis, natural history, supportive care, and indications for immediate follow-up discussed.    Approximately 35 minutes were spent in reviewing notes, preparing for visit, obtaining history, exam and evaluation, patient counseling/education and post visit documentation/orders.

## 2023-07-25 NOTE — LETTER
[address],   [address] [place] [address]  Phone:  [phone] - Fax:  [phone]   Occupational Health [facility] Progress Report and Disability Certification  Date of Service: 7/25/2023   No Show:  No  Date / Time of Next Visit: 8/14/2023 @3:45 pm   Claim Information   Patient Name: Nico Polanco  Claim Number:     Employer: [employer]  Date of Injury: 6/26/2023     Insurer / TPA: [employer]  ID / SSN:     Occupation:   Diagnosis: The encounter diagnosis was Wrist sprain, right, subsequent encounter.    Medical Information   Related to Industrial Injury? 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  NORMAN: Patient was lifting heavy load at work and felt sudden pain in the wrist. Pain is bilateral and on the back of the wrist.  Seen urgent care x4, recommended NSAIDs and work restrictions.  Patient states that overall he feels about 65% improved from initial injury.  He states that pain is mostly in the right wrist with pain over the ulnar aspect of the wrist.  He states it is worse with frequent use and repetitive lifting.  He states that he does have work restrictions but is frequently asked to do things outside the work restrictions.  He states that at rest pain is minimal.  Denies numbness or tingling.  He states he is using a wrist brace at work which does seem to help.  He states he is starting to get little bit of pain in the left wrist but from using his left wrist more to make up for weakness in the right.   Objective Findings: Right wrist: No gross deformity.  No significant tenderness.  Full range of motion with mild discomfort.  Some slight weakness with  strength, resisted wrist flexion/extension and supination.  Painful with resisted movements.  Finkelstein's negative.  Tinel's negative.   Pre-Existing Condition(s):     Assessment:    Condition Same    Status: Additional Care Required  Permanent Disability:No    Plan:      Diagnostics:      Comments:  Given continued symptoms recommend continued conservative management  Referral to hand therapy  OTC ibuprofen 600 mg 3 times daily as needed  Continue wrist brace as needed  Okay to use OTC muscle creams/ointments as needed  Restricted duty  Follow-up 3 weeks    Disability Information   Status: Released to Restricted Duty    From:  7/25/2023  Through: 8/14/2023 Restrictions are: Temporary   Physical Restrictions   Sitting:    Standing:    Stooping:    Bending:      Squatting:    Walking:    Climbing:    Pushing:      Pulling:    Other:    Reaching Above Shoulder (L):   Reaching Above Shoulder (R):       Reaching Below Shoulder (L):    Reaching Below Shoulder (R):      Not to exceed Weight Limits   Carrying(hrs):   Weight Limit(lb):   Lifting(hrs):   Weight  Limit(lb):     Comments: Limit right hand to less than 10 pounds lift/push/pull.    Repetitive Actions   Hands: i.e. Fine Manipulations from Grasping:     Feet: i.e. Operating Foot Controls:     Driving / Operate Machinery:     Health Care Provider’s Original or Electronic Signature  Donavon Ahn D.O. Health Care Provider’s Original or Electronic Signature    Donavon Ahn DO MPH     Clinic Name / Location: [address],   [address] [place] [address] Clinic Phone Number: Dept: [phone]   Appointment Time: 2:45 Pm Visit Start Time: 3:02 PM   Check-In Time:  3:01 Pm Visit Discharge Time:  3:31 pm   Original-Treating Physician or Chiropractor    Page 2-Insurer/TPA    Page 3-Employer    Page 4-Employee

## 2023-08-14 ENCOUNTER — OCCUPATIONAL MEDICINE (OUTPATIENT)
Dept: OCCUPATIONAL MEDICINE | Facility: CLINIC | Age: 28
End: 2023-08-14
Payer: COMMERCIAL

## 2023-08-14 VITALS
DIASTOLIC BLOOD PRESSURE: 82 MMHG | OXYGEN SATURATION: 98 % | HEIGHT: 68 IN | SYSTOLIC BLOOD PRESSURE: 124 MMHG | HEART RATE: 80 BPM | RESPIRATION RATE: 20 BRPM | BODY MASS INDEX: 43.04 KG/M2 | WEIGHT: 284 LBS

## 2023-08-14 DIAGNOSIS — S63.501D WRIST SPRAIN, RIGHT, SUBSEQUENT ENCOUNTER: ICD-10-CM

## 2023-08-14 PROCEDURE — 3074F SYST BP LT 130 MM HG: CPT | Performed by: PREVENTIVE MEDICINE

## 2023-08-14 PROCEDURE — 99213 OFFICE O/P EST LOW 20 MIN: CPT | Performed by: PREVENTIVE MEDICINE

## 2023-08-14 PROCEDURE — 3079F DIAST BP 80-89 MM HG: CPT | Performed by: PREVENTIVE MEDICINE

## 2023-08-14 NOTE — LETTER
[address],   [address] [place] [address]  Phone:  [phone] - Fax:  [phone]   Occupational Health [facility] Progress Report and Disability Certification  Date of Service: 8/14/2023   No Show:  No  Date / Time of Next Visit: 8/31/2023@4;15 PM   Claim Information   Patient Name: Nico Polanco  Claim Number:     Employer: [employer]  Date of Injury: 6/26/2023     Insurer / TPA: [employer]  ID / SSN:     Occupation:   Diagnosis: The encounter diagnosis was Wrist sprain, right, subsequent encounter.    Medical Information   Related to Industrial Injury? 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  NORMAN: Patient was lifting heavy load at work and felt sudden pain in the wrist. Pain is bilateral and on the back of the wrist.  Patient states he feels over 75% improved.  He states that he only has minimal discomfort with full extension and flexion of the wrist.  He states he feels ready for a trial of full duty.  He would like to hold off on hand therapy to see how he does at work.   Objective Findings: Right wrist: No gross deformity.  No significant tenderness.  Full range of motion with mild discomfort.  Finkelstein's negative.  Tinel's negative.   Pre-Existing Condition(s):     Assessment:   Condition Improved    Status: Additional Care Required  Permanent Disability:No    Plan:      Diagnostics:      Comments:  Referral to hand therapy, approved but patient like to hold off on scheduling for 2 to 3 weeks to see how it does at work  OTC ibuprofen 600 mg 3 times daily as needed  Okay to use OTC muscle creams/ointments as needed  Full duty  Follow-up 2 weeks    Disability Information   Status: Released to Full Duty    From:  8/14/2023  Through: 8/31/2023 Restrictions are:     Physical Restrictions   Sitting:    Standing:    Stooping:    Bending:       Squatting:    Walking:    Climbing:    Pushing:      Pulling:    Other:    Reaching Above Shoulder (L):   Reaching Above Shoulder (R):       Reaching Below Shoulder (L):    Reaching Below Shoulder (R):      Not to exceed Weight Limits   Carrying(hrs):   Weight Limit(lb):   Lifting(hrs):   Weight  Limit(lb):     Comments:      Repetitive Actions   Hands: i.e. Fine Manipulations from Grasping:     Feet: i.e. Operating Foot Controls:     Driving / Operate Machinery:     Health Care Provider’s Original or Electronic Signature  Donavon Ahn D.O. Health Care Provider’s Original or Electronic Signature    Donavon Ahn DO MPH     Clinic Name / Location: [address],   [address] [place], [place] [address] [place] [address] Clinic Phone Number: Dept: [phone]   Appointment Time: 3:45 Pm Visit Start Time: 3:34 PM   Check-In Time:  3:31 Pm Visit Discharge Time:  3:57 PM   Original-Treating Physician or Chiropractor    Page 2-Insurer/TPA    Page 3-Employer    Page 4-Employee

## 2023-08-14 NOTE — PROGRESS NOTES
"Subjective:     Nico Polanco is a 28 y.o. 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  NORMAN: Patient was lifting heavy load at work and felt sudden pain in the wrist. Pain is bilateral and on the back of the wrist.  Patient states he feels over 75% improved.  He states that he only has minimal discomfort with full extension and flexion of the wrist.  He states he feels ready for a trial of full duty.  He would like to hold off on hand therapy to see how he does at work.    ROS    SOCHX: Works as a  at [employer]  FH: No pertinent family history to this problem.       Objective:     /82   Pulse 80   Resp 20   Ht 1.727 m (5' 8\")   Wt (!) 129 kg (284 lb)   SpO2 98%   BMI 43.18 kg/m²     Constitutional: Patient is in no acute distress. Appears well-developed and well-nourished.   Cardiovascular: Normal rate.    Pulmonary/Chest: Effort normal. No respiratory distress.   Neurological: Patient is alert and oriented to person, place, and time.   Skin: Skin is warm and dry.   Psychiatric: Normal mood and affect. Behavior is normal.     Right wrist: No gross deformity.  No significant tenderness.  Full range of motion with mild discomfort.  Finkelstein's negative.  Tinel's negative.    Assessment/Plan:       1. Wrist sprain, right, subsequent encounter    Released to Full Duty FROM 8/14/2023 TO 8/31/2023       Referral to hand therapy, approved but patient like to hold off on scheduling for 2 to 3 weeks to see how it does at work  OTC ibuprofen 600 mg 3 times daily as needed  Okay to use OTC muscle creams/ointments as needed  Full duty  Follow-up 2 weeks    Differential diagnosis, natural history, supportive care, and indications for immediate follow-up discussed.    Approximately 25 minutes was spent in preparing for visit, obtaining history, exam and evaluation, patient " counseling/education and post visit documentation/orders.

## 2024-07-05 ENCOUNTER — OFFICE VISIT (OUTPATIENT)
Dept: MEDICAL GROUP | Facility: MEDICAL CENTER | Age: 29
End: 2024-07-05

## 2024-07-05 VITALS
HEART RATE: 83 BPM | DIASTOLIC BLOOD PRESSURE: 80 MMHG | TEMPERATURE: 97.9 F | BODY MASS INDEX: 43.8 KG/M2 | WEIGHT: 289 LBS | OXYGEN SATURATION: 95 % | HEIGHT: 68 IN | SYSTOLIC BLOOD PRESSURE: 118 MMHG

## 2024-07-05 DIAGNOSIS — Z02.89 ENCOUNTER FOR COMPLETION OF FORM WITH PATIENT: ICD-10-CM

## 2024-07-05 DIAGNOSIS — Z13.29 SCREENING FOR ENDOCRINE, METABOLIC AND IMMUNITY DISORDER: ICD-10-CM

## 2024-07-05 DIAGNOSIS — E66.01 MORBID OBESITY WITH BMI OF 40.0-44.9, ADULT (HCC): ICD-10-CM

## 2024-07-05 DIAGNOSIS — R42 VERTIGO: ICD-10-CM

## 2024-07-05 DIAGNOSIS — Z13.6 SCREENING FOR CARDIOVASCULAR CONDITION: ICD-10-CM

## 2024-07-05 DIAGNOSIS — Z13.228 SCREENING FOR ENDOCRINE, METABOLIC AND IMMUNITY DISORDER: ICD-10-CM

## 2024-07-05 DIAGNOSIS — Z11.4 ENCOUNTER FOR SCREENING FOR HIV: ICD-10-CM

## 2024-07-05 DIAGNOSIS — Z13.0 SCREENING FOR ENDOCRINE, METABOLIC AND IMMUNITY DISORDER: ICD-10-CM

## 2024-07-05 DIAGNOSIS — Z76.89 ENCOUNTER TO ESTABLISH CARE: ICD-10-CM

## 2024-07-05 DIAGNOSIS — Z11.59 NEED FOR HEPATITIS C SCREENING TEST: ICD-10-CM

## 2024-07-05 PROCEDURE — 99203 OFFICE O/P NEW LOW 30 MIN: CPT

## 2024-07-05 PROCEDURE — 3079F DIAST BP 80-89 MM HG: CPT

## 2024-07-05 PROCEDURE — 3074F SYST BP LT 130 MM HG: CPT

## 2024-07-05 RX ORDER — MECLIZINE HYDROCHLORIDE 25 MG/1
TABLET ORAL
COMMUNITY
Start: 2024-06-17 | End: 2024-07-05

## 2024-07-05 ASSESSMENT — ENCOUNTER SYMPTOMS
PALPITATIONS: 0
SHORTNESS OF BREATH: 0
COUGH: 0
ORTHOPNEA: 0
CHILLS: 0
FEVER: 0

## 2024-07-05 ASSESSMENT — PATIENT HEALTH QUESTIONNAIRE - PHQ9: CLINICAL INTERPRETATION OF PHQ2 SCORE: 0

## 2024-08-14 ENCOUNTER — OFFICE VISIT (OUTPATIENT)
Dept: URGENT CARE | Facility: PHYSICIAN GROUP | Age: 29
End: 2024-08-14

## 2024-08-14 VITALS
BODY MASS INDEX: 45.39 KG/M2 | WEIGHT: 289.2 LBS | RESPIRATION RATE: 12 BRPM | TEMPERATURE: 97.5 F | HEART RATE: 66 BPM | SYSTOLIC BLOOD PRESSURE: 124 MMHG | DIASTOLIC BLOOD PRESSURE: 64 MMHG | HEIGHT: 67 IN | OXYGEN SATURATION: 97 %

## 2024-08-14 DIAGNOSIS — B96.89 BACTERIAL SKIN INFECTION: ICD-10-CM

## 2024-08-14 DIAGNOSIS — L08.9 BACTERIAL SKIN INFECTION: ICD-10-CM

## 2024-08-14 DIAGNOSIS — S80.812A ABRASION OF LEFT LOWER LEG, INITIAL ENCOUNTER: ICD-10-CM

## 2024-08-14 PROCEDURE — 99213 OFFICE O/P EST LOW 20 MIN: CPT

## 2024-08-14 PROCEDURE — 3078F DIAST BP <80 MM HG: CPT

## 2024-08-14 PROCEDURE — 3074F SYST BP LT 130 MM HG: CPT

## 2024-08-14 RX ORDER — SULFAMETHOXAZOLE/TRIMETHOPRIM 800-160 MG
1 TABLET ORAL 2 TIMES DAILY
Qty: 10 TABLET | Refills: 0 | Status: SHIPPED | OUTPATIENT
Start: 2024-08-14 | End: 2024-08-19

## 2024-08-14 NOTE — LETTER
[facility] [place]  [address]  [place] [facility] [place] [address]     August 14, 2024    Patient: Nico Polanco   YOB: 1995   Date of Visit: 8/14/2024       To Whom It May Concern:    Nico Polanco was seen and treated in our department on 8/14/2024.     Please excuse Nico from work 8/12/24-8/18/24.         Sincerely,     HANNA Omer.

## 2024-08-15 NOTE — PROGRESS NOTES
Subjective     Nico Polanco is a 29 y.o. male who presents with left lower leg injury x3 days.     HPI:   Nico is a 28yo male presenting for left anterior lower leg injury x3 days. Reports he skinned his leg when he fell on turf. Denies previous injury to the LLE. He has attempted peroxide and ibuprofen for relief. Patient reports large abrasion with surrounding redness, warmth, and purulent drainage. Denies numbness/tingling or sensation loss. No fever, chills, or vomiting. RLE ROM is intact without abnormality.      Review of Systems   Constitutional:  Negative for chills, fever and malaise/fatigue.   Eyes:  Negative for blurred vision, double vision, photophobia, pain, discharge and redness.   Respiratory:  Negative for cough, sputum production, shortness of breath, wheezing and stridor.    Cardiovascular:  Negative for chest pain and palpitations.   Gastrointestinal:  Negative for abdominal pain, diarrhea, nausea and vomiting.   Musculoskeletal:  Negative for back pain, joint pain, myalgias and neck pain.   Skin:  Positive for rash. Negative for itching.   Neurological:  Negative for dizziness, tingling, sensory change, speech change, focal weakness, seizures, loss of consciousness, weakness and headaches.     History reviewed. No pertinent past medical history.     History reviewed. No pertinent surgical history.     Patient has no known allergies.     Social History     Tobacco Use    Smoking status: Some Days     Current packs/day: 0.50     Average packs/day: 0.5 packs/day for 1.6 years (0.8 ttl pk-yrs)     Types: Cigarettes     Start date: 2023    Smokeless tobacco: Current     Types: Chew   Vaping Use    Vaping status: Never Used   Substance Use Topics    Alcohol use: Not Currently    Drug use: Not Currently     Family History   Problem Relation Age of Onset    Asthma Mother     No Known Problems Father     No Known Problems Sister     No Known Problems Brother     Diabetes Paternal Grandmother      "Hypertension Paternal Grandmother     Asthma Paternal Grandmother     Hypertension Paternal Grandfather       Medications, Allergies, and current problem list reviewed today in Epic.      Objective     /64 (BP Location: Left arm, Patient Position: Sitting, BP Cuff Size: Large adult)   Pulse 66   Temp 36.4 °C (97.5 °F)   Resp 12   Ht 1.702 m (5' 7\")   Wt (!) 131 kg (289 lb 3.2 oz)   SpO2 97%   BMI 45.30 kg/m²      Physical Exam  Vitals reviewed.   Constitutional:       General: He is not in acute distress.  HENT:      Head: No raccoon eyes, Kohli's sign, abrasion, contusion, right periorbital erythema, left periorbital erythema or laceration.      Jaw: There is normal jaw occlusion. No tenderness, swelling, pain on movement or malocclusion.      Nose: Nose normal.   Eyes:      General: Vision grossly intact. Gaze aligned appropriately. No visual field deficit.     Extraocular Movements: Extraocular movements intact.      Right eye: No nystagmus.      Left eye: No nystagmus.      Conjunctiva/sclera: Conjunctivae normal.      Pupils: Pupils are equal, round, and reactive to light.      Right eye: Pupil is round, reactive and not sluggish.      Left eye: Pupil is round, reactive and not sluggish.      Funduscopic exam:     Right eye: Red reflex present.         Left eye: Red reflex present.  Neck:      Trachea: Trachea normal. No abnormal tracheal secretions or tracheal deviation.   Cardiovascular:      Rate and Rhythm: Normal rate and regular rhythm.      Pulses: Normal pulses.           Popliteal pulses are 2+ on the right side and 2+ on the left side.        Dorsalis pedis pulses are 2+ on the right side and 2+ on the left side.        Posterior tibial pulses are 2+ on the right side and 2+ on the left side.      Heart sounds: Normal heart sounds.   Pulmonary:      Effort: Pulmonary effort is normal. No tachypnea, accessory muscle usage, prolonged expiration, respiratory distress or retractions.      " Breath sounds: Normal breath sounds. No stridor. No wheezing, rhonchi or rales.   Musculoskeletal:         General: Normal range of motion.      Cervical back: Full passive range of motion without pain, normal range of motion and neck supple. No erythema, rigidity or crepitus. No pain with movement, spinous process tenderness or muscular tenderness. Normal range of motion.      Right knee: Normal.      Left knee: No swelling, deformity, erythema, ecchymosis, bony tenderness or crepitus. Normal range of motion. No tenderness. No LCL laxity, MCL laxity, ACL laxity or PCL laxity.Normal alignment, normal meniscus and normal patellar mobility. Normal pulse.      Instability Tests: Anterior drawer test negative.      Right lower leg: Normal.      Left lower leg: Swelling and tenderness present. No deformity or bony tenderness.      Right ankle: Normal.      Left ankle: No swelling, deformity or ecchymosis. No tenderness. Normal range of motion. Anterior drawer test negative. Normal pulse.      Left Achilles Tendon: No tenderness or defects. Rothman's test negative.      Right foot: Normal.      Left foot: Normal range of motion and normal capillary refill. No swelling, deformity, foot drop, tenderness, bony tenderness or crepitus. Normal pulse.   Skin:     General: Skin is warm and dry.      Capillary Refill: Capillary refill takes less than 2 seconds.      Findings: Abrasion and erythema present.      Comments: Large abrasion to anterior left lower leg. Positive surrounding erythema, warmth, and purulent discharge. No streaking. Distal neurovascular intact.         Neurological:      Mental Status: He is alert. Mental status is at baseline.   Psychiatric:         Mood and Affect: Mood normal.         Behavior: Behavior normal.         Thought Content: Thought content normal.       Assessment & Plan     1. Bacterial skin infection   - sulfamethoxazole-trimethoprim (BACTRIM DS) 800-160 MG tablet; Take 1 Tablet by mouth 2  times a day for 5 days.  Dispense: 10 Tablet; Refill: 0    2. Abrasion of left lower leg, initial encounter        MDM/Comments:   Patient has stable vital signs and is non-toxic appearing. The patient is not experiencing any systemic symptoms and abrasion is without necrotic tissue.    History and examination consistent with evolving superficial soft tissue infection, Bactrim prescribed. Advised patient to regularly monitor site and return for any worsening/additional or systemic symptoms.   Alarm findings discussed, patient verbalizes understanding.     I also discussed the 4 cardinal signs of worsening infection with the patient which are increasing pain, redness, warmth, and swelling. Patient verbalizes understanding.       Illness progression and alarm symptoms discussed with patient, emphasizing low threshold for returning to clinic/emergency department for worsening symptoms. Patient is agreeable to the plan and verbalizes understanding, and will follow up if warranted.       Differential diagnosis, natural history, supportive care, and indications for immediate follow-up discussed.     Follow-up as needed if symptoms worsen or fail to improve to PCP, Urgent care or Emergency Room.                                          Electronically signed by DANICA Servin

## 2024-08-17 ASSESSMENT — ENCOUNTER SYMPTOMS
DIARRHEA: 0
COUGH: 0
MYALGIAS: 0
PHOTOPHOBIA: 0
EYE PAIN: 0
EYE REDNESS: 0
VOMITING: 0
CHILLS: 0
STRIDOR: 0
SPEECH CHANGE: 0
WHEEZING: 0
FOCAL WEAKNESS: 0
DOUBLE VISION: 0
HEADACHES: 0
BLURRED VISION: 0
SHORTNESS OF BREATH: 0
WEAKNESS: 0
FEVER: 0
TINGLING: 0
NAUSEA: 0
DIZZINESS: 0
SENSORY CHANGE: 0
SPUTUM PRODUCTION: 0
ABDOMINAL PAIN: 0
LOSS OF CONSCIOUSNESS: 0
EYE DISCHARGE: 0
SEIZURES: 0
NECK PAIN: 0
PALPITATIONS: 0
BACK PAIN: 0

## 2024-08-17 ASSESSMENT — VISUAL ACUITY: OU: 1

## 2024-11-11 ENCOUNTER — OFFICE VISIT (OUTPATIENT)
Dept: URGENT CARE | Facility: PHYSICIAN GROUP | Age: 29
End: 2024-11-11

## 2024-11-11 VITALS
RESPIRATION RATE: 12 BRPM | OXYGEN SATURATION: 97 % | BODY MASS INDEX: 43.79 KG/M2 | SYSTOLIC BLOOD PRESSURE: 144 MMHG | HEIGHT: 67 IN | WEIGHT: 279 LBS | HEART RATE: 76 BPM | DIASTOLIC BLOOD PRESSURE: 84 MMHG | TEMPERATURE: 97.9 F

## 2024-11-11 DIAGNOSIS — R11.0 NAUSEA: ICD-10-CM

## 2024-11-11 DIAGNOSIS — B34.9 VIRAL ILLNESS: ICD-10-CM

## 2024-11-11 PROCEDURE — 99213 OFFICE O/P EST LOW 20 MIN: CPT | Performed by: PHYSICIAN ASSISTANT

## 2024-11-11 PROCEDURE — 3077F SYST BP >= 140 MM HG: CPT | Performed by: PHYSICIAN ASSISTANT

## 2024-11-11 PROCEDURE — 3079F DIAST BP 80-89 MM HG: CPT | Performed by: PHYSICIAN ASSISTANT

## 2024-11-11 RX ORDER — ONDANSETRON 4 MG/1
4 TABLET, ORALLY DISINTEGRATING ORAL EVERY 8 HOURS PRN
Qty: 10 TABLET | Refills: 0 | Status: SHIPPED | OUTPATIENT
Start: 2024-11-11

## 2024-11-11 ASSESSMENT — ENCOUNTER SYMPTOMS
NAUSEA: 1
SORE THROAT: 0
HEADACHES: 1
VOMITING: 0
COUGH: 0
FEVER: 0
FLANK PAIN: 0
CHILLS: 0

## 2024-11-11 NOTE — LETTER
November 11, 2024         Patient: Nico Polanco   YOB: 1995   Date of Visit: 11/11/2024           To Whom it May Concern:    Nico Polanco was seen in my clinic on 11/11/2024.  Please excuse patient's absence yesterday and today.    If you have any questions or concerns, please don't hesitate to call.        Sincerely,           Clive Bean P.A.-C.  Electronically Signed

## 2024-11-12 NOTE — PROGRESS NOTES
"  Subjective:   Nico Polanco is a 29 y.o. male who presents today with   Chief Complaint   Patient presents with    Congestion     nausea, x2d      Nausea  This is a new problem. Episode onset: 2 days. The problem occurs constantly. The problem has been unchanged. Associated symptoms include congestion, headaches and nausea. Pertinent negatives include no chills, coughing, fever, sore throat or vomiting. He has tried nothing for the symptoms. The treatment provided no relief.     Patient states he has felt nauseous and had some congestion.  No fevers or chills or sore throat.  Some slightly looser stools but no diarrhea.    PMH:  has no past medical history on file.  MEDS:   Current Outpatient Medications:     ondansetron (ZOFRAN ODT) 4 MG TABLET DISPERSIBLE, Take 1 Tablet by mouth every 8 hours as needed for Nausea/Vomiting., Disp: 10 Tablet, Rfl: 0  ALLERGIES: No Known Allergies  SURGHX: No past surgical history on file.  SOCHX:  reports that he has been smoking cigarettes. He started smoking about 22 months ago. He has a 0.9 pack-year smoking history. His smokeless tobacco use includes chew. He reports that he does not currently use alcohol. He reports that he does not currently use drugs.  FH: Reviewed with patient, not pertinent to this visit.     Review of Systems   Constitutional:  Negative for chills and fever.   HENT:  Positive for congestion. Negative for sore throat.    Respiratory:  Negative for cough.    Gastrointestinal:  Positive for nausea. Negative for vomiting.   Genitourinary:  Negative for dysuria, flank pain, frequency, hematuria and urgency.   Neurological:  Positive for headaches.      Objective:   BP (!) 144/84 (BP Location: Right arm, Patient Position: Sitting, BP Cuff Size: Adult long)   Pulse 76   Temp 36.6 °C (97.9 °F) (Temporal)   Resp 12   Ht 1.702 m (5' 7\")   Wt (!) 127 kg (279 lb)   SpO2 97%   BMI 43.70 kg/m²   Physical Exam  Vitals and nursing note reviewed. "   Constitutional:       General: He is not in acute distress.     Appearance: Normal appearance. He is well-developed. He is not ill-appearing or toxic-appearing.   HENT:      Head: Normocephalic and atraumatic.      Right Ear: Hearing normal.      Left Ear: Hearing normal.      Mouth/Throat:      Mouth: Mucous membranes are moist.      Pharynx: Uvula midline. Postnasal drip present. No oropharyngeal exudate or posterior oropharyngeal erythema.      Tonsils: No tonsillar exudate or tonsillar abscesses.   Cardiovascular:      Rate and Rhythm: Normal rate and regular rhythm.      Heart sounds: Normal heart sounds.   Pulmonary:      Effort: Pulmonary effort is normal. No respiratory distress.      Breath sounds: Normal breath sounds. No stridor. No wheezing, rhonchi or rales.   Abdominal:      General: There is no distension.      Palpations: Abdomen is soft.      Tenderness: There is no abdominal tenderness. There is no guarding.   Musculoskeletal:      Comments: Normal movement in all 4 extremities   Skin:     General: Skin is warm and dry.   Neurological:      Mental Status: He is alert.      Coordination: Coordination normal.   Psychiatric:         Mood and Affect: Mood normal.       Assessment/Plan:   Assessment    1. Viral illness    2. Nausea  - ondansetron (ZOFRAN ODT) 4 MG TABLET DISPERSIBLE; Take 1 Tablet by mouth every 8 hours as needed for Nausea/Vomiting.  Dispense: 10 Tablet; Refill: 0    Symptoms and presentation appear consistent with viral illness.  Could be some nausea related with postnasal drainage and congestion.  We will hold off on viral testing at this point given that patient is self-pay as patient does not want to incur any further cost. Recommend using over-the-counter symptomatic relief per 's instructions.  Patient encouraged to get plenty of rest, use OTC tylenol for pain/fever, and drink plenty of fluids.      Differential diagnosis, natural history, supportive care, and  indications for immediate follow-up discussed.   Patient given instructions and understanding of medications and treatment.    If not improving in 3-5 days, F/U with PCP or return to UC if symptoms worsen.    Patient agreeable to plan.    Please note that this dictation was created using voice recognition software. I have made every reasonable attempt to correct obvious errors, but I expect that there are errors of grammar and possibly content that I did not discover before finalizing the note.    Clive Bean PA-C

## 2024-11-19 ENCOUNTER — APPOINTMENT (OUTPATIENT)
Dept: URGENT CARE | Facility: CLINIC | Age: 29
End: 2024-11-19

## 2024-11-25 ENCOUNTER — HOSPITAL ENCOUNTER (EMERGENCY)
Facility: MEDICAL CENTER | Age: 29
End: 2024-11-26
Attending: STUDENT IN AN ORGANIZED HEALTH CARE EDUCATION/TRAINING PROGRAM

## 2024-11-25 DIAGNOSIS — H81.10 BENIGN PAROXYSMAL POSITIONAL VERTIGO, UNSPECIFIED LATERALITY: ICD-10-CM

## 2024-11-25 PROCEDURE — 99282 EMERGENCY DEPT VISIT SF MDM: CPT

## 2024-11-25 RX ORDER — PROCHLORPERAZINE 25 MG
25 SUPPOSITORY, RECTAL RECTAL EVERY 6 HOURS PRN
Qty: 10 SUPPOSITORY | Refills: 0 | Status: SHIPPED | OUTPATIENT
Start: 2024-11-25 | End: 2024-11-25

## 2024-11-25 RX ORDER — MECLIZINE HYDROCHLORIDE 25 MG/1
12.5 TABLET ORAL 3 TIMES DAILY PRN
Qty: 30 TABLET | Refills: 0 | Status: SHIPPED | OUTPATIENT
Start: 2024-11-25

## 2024-11-25 RX ORDER — PROCHLORPERAZINE MALEATE 10 MG
10 TABLET ORAL EVERY 6 HOURS PRN
Qty: 30 TABLET | Refills: 3 | Status: SHIPPED | OUTPATIENT
Start: 2024-11-25

## 2024-11-25 ASSESSMENT — PAIN DESCRIPTION - PAIN TYPE: TYPE: ACUTE PAIN

## 2024-11-26 VITALS
HEART RATE: 74 BPM | BODY MASS INDEX: 43.8 KG/M2 | TEMPERATURE: 98 F | DIASTOLIC BLOOD PRESSURE: 84 MMHG | WEIGHT: 289.02 LBS | HEIGHT: 68 IN | OXYGEN SATURATION: 95 % | SYSTOLIC BLOOD PRESSURE: 121 MMHG | RESPIRATION RATE: 16 BRPM

## 2024-11-26 NOTE — ED NOTES
PT was brought back to room. PT ambulated to room w/ steady gait. PT given call light, bed locked and lowest position. Chart put up for ERP.

## 2024-11-26 NOTE — ED PROVIDER NOTES
ED Provider Note    CHIEF COMPLAINT  Chief Complaint   Patient presents with    Dizziness     Pt comes in from home for above complaint, x3 days.  Pt also states he has a headache and nausea, no vomiting reported.      Headache    Nausea       EXTERNAL RECORDS REVIEWED  Outpatient Notes patient seen in urgent care 11/11/2024 with nasal congestion and nausea.  Suspected viral illness and discharged with Zofran.    HPI/ROS  LIMITATION TO HISTORY   Select: : None      Nico Polanco is a 29 y.o. male who presents to the emergency department for evaluation of intermittent episodes of room spinning dizziness and nausea as well as intermittent mild headaches.  He reports on and off symptoms for the last 3 days.  He reports the symptoms occur when he stands up quickly or moves his head to the side and last for about a minute before settling down.  He denies any symptoms currently including headache vertigo or nausea.  He denies any associated vision changes, numbness or weakness of the arms legs or face, difficulty swallowing or speaking, difficulty walking, neck pain.  He reports that he experienced similar symptoms a few months ago that lasted about 3 weeks before self resolving but were much more severe at that time.  He states that meclizine worked for the symptoms at that time.  He denies fevers, neck pain, ear pain or ear ringing.    PAST MEDICAL HISTORY   Vertigo    SURGICAL HISTORY  patient denies any surgical history    FAMILY HISTORY  Family History   Problem Relation Age of Onset    Asthma Mother     No Known Problems Father     No Known Problems Sister     No Known Problems Brother     Diabetes Paternal Grandmother     Hypertension Paternal Grandmother     Asthma Paternal Grandmother     Hypertension Paternal Grandfather        SOCIAL HISTORY  Social History     Tobacco Use    Smoking status: Some Days     Current packs/day: 0.50     Average packs/day: 0.5 packs/day for 1.9 years (0.9 ttl pk-yrs)     Types:  "Cigarettes     Start date: 2023    Smokeless tobacco: Current     Types: Chew   Vaping Use    Vaping status: Never Used   Substance and Sexual Activity    Alcohol use: Not Currently    Drug use: Not Currently    Sexual activity: Not on file       CURRENT MEDICATIONS  Home Medications       Reviewed by Delores Villeda R.N. (Registered Nurse) on 11/25/24 at 2154  Med List Status: Partial     Medication Last Dose Status   ondansetron (ZOFRAN ODT) 4 MG TABLET DISPERSIBLE  Active                    ALLERGIES  No Known Allergies    PHYSICAL EXAM  VITAL SIGNS: BP (!) 146/75   Pulse 88   Temp 36.3 °C (97.4 °F) (Temporal)   Resp 12   Ht 1.727 m (5' 8\")   Wt (!) 131 kg (289 lb 0.4 oz)   SpO2 96%   BMI 43.95 kg/m²    Constitutional: No acute distress, pleasant and very well-appearing  HEENT: Atraumatic, normocephalic, pupils are equal round reactive to light, extraocular muscles intact, nose normal, mouth shows moist mucous membranes  Cardiovascular: Regular rate and rhythm, no murmur, rub or gallop, 2+ pulses peripherally x4  Thorax & Lungs: No respiratory distress, no wheezes, rales or rhonchi, no chest wall tenderness.  GI: Soft, non-distended, non-tender, no rebound  Skin: Warm, dry, no acute rash or lesion  Musculoskeletal: Moving all extremities, no acute deformity, no edema, no tenderness  Neurologic: A&Ox3, at baseline mentation, cranial nerves II through XII intact, 5 out of 5 strength and normal sensation throughout, normal finger-nose-finger, no pronator drift, negative Romberg, ambulatory with a stable gait.  Psychiatric: Appropriate affect for situation at this time        COURSE & MEDICAL DECISION MAKING    ASSESSMENT, COURSE AND PLAN  Care Narrative:     Otherwise healthy 29-year-old male with a prior history of self resolving vertigo presents to the ER with 3 days of waxing and waning vertiginous symptoms associated with nausea and mild headaches.  Symptom-free at the time of my evaluation.  Vital signs " stable.  Examination unremarkable including completely normal neurologic assessment.  Could not elicit vertigo with Votaw-Hallpike maneuver.  Suspect intermittent BPPV as the etiology of his symptoms.  Very low clinical concern at this point for vertebral artery dissection, cerebellar stroke, venous sinus thrombosis or additional serious pathology of his intermittent and now completely resolved symptoms.  Discussed Epley maneuver and provided a link to a video that he could watch to perform this at home in the event his symptoms recur and represcribed Compazine and meclizine which has worked for the patient in the past.  Recommended he follow-up with his primary care doctor for reassessment given recurrence of symptoms for consideration of ENT or physical therapy referral but at this point I do not feel any further laboratory imaging workup is necessary.  Patient discharged stable condition.      ADDITIONAL PROBLEMS MANAGED  None    DISPOSITION AND DISCUSSIONS  I have discussed management of the patient with the following physicians and MIKE's: None    Discussion of management with other Q or appropriate source(s): None     Escalation of care considered, and ultimately not performed:Laboratory analysis and diagnostic imaging    FINAL DIAGNOSIS  1. Benign paroxysmal positional vertigo, unspecified laterality         Electronically signed by: Kris Adames M.D., 11/25/2024 11:45 PM

## 2024-11-26 NOTE — DISCHARGE INSTRUCTIONS
As we discussed if you are experiencing symptoms of dizziness you should watch the following video and try to complete the maneuvers.      [email or web address] [email or web address]/[email or web address]    For nausea you can take Compazine every 6 hours.  For dizziness you can take meclizine either half a tablet or a full tablet up to 3 times daily.    Follow-up with your primary care doctor.    Return to the ER with any severe headache, vision changes, new numbness or weakness of the arms legs or face, difficulty swallowing or speaking, if you cannot walk or otherwise feeling worse.

## 2024-11-26 NOTE — ED TRIAGE NOTES
"Chief Complaint   Patient presents with    Dizziness     Pt comes in from home for above complaint, x3 days.  Pt also states he has a headache and nausea, no vomiting reported.      Headache    Nausea       Patient to triage ambulatory with a steady gait, AAOx4, Appropriate precautions in place.     Explained wait time and triage process. Placed back in ED lobby. Told to notify ED tech or RN of any changes, verbalized understanding.    BP (!) 146/75   Pulse 88   Temp 36.3 °C (97.4 °F) (Temporal)   Resp 12   Ht 1.727 m (5' 8\")   Wt (!) 131 kg (289 lb 0.4 oz)   SpO2 96%   BMI 43.95 kg/m²     "

## 2025-02-07 ENCOUNTER — APPOINTMENT (OUTPATIENT)
Dept: MEDICAL GROUP | Facility: MEDICAL CENTER | Age: 30
End: 2025-02-07

## 2025-02-07 VITALS
OXYGEN SATURATION: 97 % | HEIGHT: 68 IN | HEART RATE: 75 BPM | BODY MASS INDEX: 44.16 KG/M2 | TEMPERATURE: 98.9 F | SYSTOLIC BLOOD PRESSURE: 128 MMHG | DIASTOLIC BLOOD PRESSURE: 76 MMHG | WEIGHT: 291.4 LBS

## 2025-02-07 DIAGNOSIS — Z02.89 ENCOUNTER FOR COMPLETION OF FORM WITH PATIENT: ICD-10-CM

## 2025-02-07 DIAGNOSIS — E66.01 MORBID OBESITY WITH BMI OF 40.0-44.9, ADULT (HCC): ICD-10-CM

## 2025-02-07 DIAGNOSIS — R42 VERTIGO: ICD-10-CM

## 2025-02-07 DIAGNOSIS — R03.0 ELEVATED BLOOD PRESSURE READING: ICD-10-CM

## 2025-02-07 DIAGNOSIS — H81.12 BENIGN PAROXYSMAL POSITIONAL VERTIGO OF LEFT EAR: ICD-10-CM

## 2025-02-07 DIAGNOSIS — R11.0 NAUSEA: ICD-10-CM

## 2025-02-07 PROCEDURE — 99214 OFFICE O/P EST MOD 30 MIN: CPT

## 2025-02-07 PROCEDURE — 7101 PR PHYSICAL

## 2025-02-07 PROCEDURE — 3074F SYST BP LT 130 MM HG: CPT

## 2025-02-07 RX ORDER — ONDANSETRON 4 MG/1
4 TABLET, ORALLY DISINTEGRATING ORAL EVERY 8 HOURS PRN
Qty: 20 TABLET | Refills: 3 | Status: SHIPPED | OUTPATIENT
Start: 2025-02-07

## 2025-02-07 RX ORDER — MECLIZINE HYDROCHLORIDE 25 MG/1
12.5 TABLET ORAL 3 TIMES DAILY PRN
Qty: 90 TABLET | Refills: 3 | Status: SHIPPED | OUTPATIENT
Start: 2025-02-07

## 2025-02-07 RX ORDER — PROCHLORPERAZINE MALEATE 10 MG
10 TABLET ORAL EVERY 6 HOURS PRN
Qty: 30 TABLET | Refills: 3 | Status: SHIPPED | OUTPATIENT
Start: 2025-02-07

## 2025-02-07 ASSESSMENT — ENCOUNTER SYMPTOMS
PALPITATIONS: 0
FEVER: 0
CHILLS: 0
COUGH: 0
ORTHOPNEA: 0
SHORTNESS OF BREATH: 0

## 2025-02-07 ASSESSMENT — PATIENT HEALTH QUESTIONNAIRE - PHQ9: CLINICAL INTERPRETATION OF PHQ2 SCORE: 0

## 2025-02-07 NOTE — LETTER
February 7, 2025    To Whom It May Concern:         This is confirmation that Nico ERNST Sherri attended his scheduled appointment with QUINTON Haas on 2/07/25. Please excuse his missed work on 11/24-11/25, 12/1-12/2, 12/16, 12/29-1/19.         If you have any questions please do not hesitate to call me at the phone number listed below.    Sincerely,          Gertrude Neri A.P.R.N.  [phone]

## 2025-02-07 NOTE — PROGRESS NOTES
Subjective:     Verbal consent was acquired by the patient to use [employer] ambient listening note generation during this visit Yes    CC: Follow-Up (Pt states he has been having off and on Vertigo.)      HPI:   Nico is a 30 y.o. male who presents today for:    History of Present Illness  The patient presents for evaluation of vertigo.    He has been experiencing intermittent episodes of vertigo, which initially subsided for a few months but recurred randomly in November and December.  Symptoms have been present interim only since June 2024.  The severity of these episodes varies, with some causing significant dizziness and others being less intense. The duration of each episode also varies, lasting from a few days to several weeks before resolving spontaneously. He recently sought emergency care on 11/25/2024 due to a particularly severe episode. He has been managing his symptoms by taking time off work to rest and maintaining high fluid intake, which appears to alleviate the symptoms.  Meclizine, Zofran, and Compazine have been helpful.  He was previously advised to consider physical therapy if his symptoms persisted, but he was unable to pursue this due to lack of insurance coverage at the time. He now has insurance and is open to this option. During his recent ER visit, it was noted that his symptoms were more pronounced when he turned his head to the left. He is requesting refills of meclizine and Zofran or Compazine. He is also seeking a work note to cover his absences due to his condition.  He is planning to apply for intermittent FMLA leave.  He reports no associated ear pain or pressure but suggests that he may need to have his ears cleaned. He is currently working night shifts from 6 PM to 6 AM and wonders if this could be contributing to his symptoms.              Allergies: Patient has no known allergies.     Medications:   Current Outpatient Medications:     meclizine (ANTIVERT) 25 MG Tab, Take  "0.5 Tablets by mouth 3 times a day as needed for Vertigo., Disp: 90 Tablet, Rfl: 3    prochlorperazine (COMPAZINE) 10 MG Tab, Take 1 Tablet by mouth every 6 hours as needed for Nausea/Vomiting., Disp: 30 Tablet, Rfl: 3    ondansetron (ZOFRAN ODT) 4 MG TABLET DISPERSIBLE, Take 1 Tablet by mouth every 8 hours as needed for Nausea/Vomiting., Disp: 20 Tablet, Rfl: 3      ROS:  Review of Systems   Constitutional:  Negative for chills and fever.   Respiratory:  Negative for cough and shortness of breath.    Cardiovascular:  Negative for chest pain, palpitations, orthopnea and leg swelling.       Objective:     Exam:  /76 (BP Location: Right arm, Patient Position: Sitting)   Pulse 75   Temp 37.2 °C (98.9 °F) (Temporal)   Ht 1.727 m (5' 8\")   Wt (!) 132 kg (291 lb 6.4 oz)   SpO2 97%   BMI 44.31 kg/m²  Body mass index is 44.31 kg/m².    Physical Exam  Constitutional:       Appearance: He is normal weight.   Eyes:      Pupils: Pupils are equal, round, and reactive to light.   Cardiovascular:      Rate and Rhythm: Normal rate and regular rhythm.      Pulses: Normal pulses.      Heart sounds: Normal heart sounds.   Pulmonary:      Effort: Pulmonary effort is normal.      Breath sounds: Normal breath sounds.   Neurological:      Mental Status: He is alert and oriented to person, place, and time.   Psychiatric:         Mood and Affect: Mood normal.         Behavior: Behavior normal.           Assessment & Plan:     Nico a 30 y.o. male with the following -     Assessment & Plan      1. Vertigo  2. Benign paroxysmal positional vertigo of left ear  3. Nausea  Chronic, unstable  The patient's vertigo is likely due to an imbalance in the otoliths within the ear canal, which disrupts the body's equilibrium. He reports that the vertigo sometimes lasts for a couple of weeks or a few days and is triggered when turning his head to the left. A referral for physical therapy has been initiated to address the recurrent vertigo. " He has been advised to commence physical therapy sessions as soon as possible. Refills for meclizine and Zofran or Compazine have been provided. A work note has been issued to cover his absences due to the vertigo. He has been informed that he can send a [email or web address] message in the future to schedule an appointment if necessary.  - Referral to Physical Therapy  - meclizine (ANTIVERT) 25 MG Tab; Take 0.5 Tablets by mouth 3 times a day as needed for Vertigo.  Dispense: 90 Tablet; Refill: 3  - prochlorperazine (COMPAZINE) 10 MG Tab; Take 1 Tablet by mouth every 6 hours as needed for Nausea/Vomiting.  Dispense: 30 Tablet; Refill: 3  - ondansetron (ZOFRAN ODT) 4 MG TABLET DISPERSIBLE; Take 1 Tablet by mouth every 8 hours as needed for Nausea/Vomiting.  Dispense: 20 Tablet; Refill: 3    4. Encounter for completion of form with patient  Patient to apply for [facility] intermittent leave and to schedule appointment once he does to complete paperwork. Appeal to [facility] form completed with patient.     5. Elevated blood pressure reading  Acute, uncomplicated  His blood pressure was slightly elevated during this visit, potentially due to inadequate sleep and stress. Upon re-evaluation, his blood pressure normalized to 128/76. BP returned to normal at the end of the visit.     6. Morbid obesity with BMI of 40.0-44.9, adult (HCC)  Chronic, stable  - Patient identified as having weight management issue.  Appropriate orders and counseling given.        Anticipatory guidance included the following: Patient counseled about skin care, diet, supplements, smoking, drugs/alcohol use, safe sex and exercise.     Return if symptoms worsen or fail to improve.    Please note that this dictation was created using voice recognition software. I have made every reasonable attempt to correct obvious errors, but I expect that there are errors of grammar and possibly content that I did not discover before finalizing the note.

## 2025-02-12 NOTE — Clinical Note
REFERRAL APPROVAL NOTICE         Sent on February 12, 2025                   Nico Polanco  [address]  [address]  [place] [address]                   Dear Mr. Polanco,    After a careful review of the medical information and benefit coverage, [facility] has processed your referral. See below for additional details.    If applicable, you must be actively enrolled with your insurance for coverage of the authorized service. If you have any questions regarding your coverage, please contact your insurance directly.    REFERRAL INFORMATION   Referral #:  [id number]  Referred-To Department    Referred-By Provider:  Physical Therapy    QUINTON Haas   Phys Therapy Chris      [address]  [place] [address]  [phone] [address] [facility] [address], [address]  [place] [address]  [phone]    Referral Start Date:  02/07/2025  Referral End Date:   02/07/2026             SCHEDULING  If you do not already have an appointment, please call [phone] to make an appointment.     MORE INFORMATION  If you do not already have a [email or web address] account, sign up at: [email or web address]  You can access your medical information, make appointments, see lab results, billing information, and more.  If you have questions regarding this referral, please contact  the [facility] Referrals department at:             [phone]. Monday - Friday 8:00AM - 5:00PM.     Sincerely,    [facility]

## 2025-04-25 ENCOUNTER — OFFICE VISIT (OUTPATIENT)
Dept: MEDICAL GROUP | Facility: MEDICAL CENTER | Age: 30
End: 2025-04-25
Payer: COMMERCIAL

## 2025-04-25 VITALS
WEIGHT: 281.2 LBS | DIASTOLIC BLOOD PRESSURE: 72 MMHG | OXYGEN SATURATION: 98 % | SYSTOLIC BLOOD PRESSURE: 124 MMHG | HEIGHT: 68 IN | HEART RATE: 61 BPM | TEMPERATURE: 97.9 F | RESPIRATION RATE: 18 BRPM | BODY MASS INDEX: 42.62 KG/M2

## 2025-04-25 DIAGNOSIS — G89.29 CHRONIC PAIN OF BOTH KNEES: ICD-10-CM

## 2025-04-25 DIAGNOSIS — M79.671 BILATERAL FOOT PAIN: ICD-10-CM

## 2025-04-25 DIAGNOSIS — R42 VERTIGO: ICD-10-CM

## 2025-04-25 DIAGNOSIS — M25.561 CHRONIC PAIN OF BOTH KNEES: ICD-10-CM

## 2025-04-25 DIAGNOSIS — Z02.89 ENCOUNTER FOR COMPLETION OF FORM WITH PATIENT: ICD-10-CM

## 2025-04-25 DIAGNOSIS — H81.12 BENIGN PAROXYSMAL POSITIONAL VERTIGO OF LEFT EAR: ICD-10-CM

## 2025-04-25 DIAGNOSIS — M79.672 BILATERAL FOOT PAIN: ICD-10-CM

## 2025-04-25 DIAGNOSIS — M25.562 CHRONIC PAIN OF BOTH KNEES: ICD-10-CM

## 2025-04-25 PROCEDURE — 3074F SYST BP LT 130 MM HG: CPT

## 2025-04-25 PROCEDURE — 99213 OFFICE O/P EST LOW 20 MIN: CPT

## 2025-04-25 PROCEDURE — 3078F DIAST BP <80 MM HG: CPT

## 2025-04-25 ASSESSMENT — ENCOUNTER SYMPTOMS
FEVER: 0
PALPITATIONS: 0
CHILLS: 0
ORTHOPNEA: 0
SHORTNESS OF BREATH: 0
COUGH: 0

## 2025-04-25 NOTE — PROGRESS NOTES
Subjective:     Verbal consent was acquired by the patient to use [employer] ambient listening note generation during this visit Yes    CC: Paperwork ([employer] paperwork)      HPI:   Nico is a 30 y.o. male who presents today for:    History of Present Illness  The patient presents for evaluation of gonalgia and gout.    Vertigo  The patient reports an improvement in his condition, with a decrease in the frequency of dizziness episodes. He has been able to maintain his work schedule, although approval for intermittent leave is required. He had a flare up the end of last month and had to miss several days. Physical therapy has not been pursued due to working over time, since his wife is not currently working, but he plans to schedule an appointment soon. Paperwork for 5 days of leave has been requested, specifically on 03/18/2025, 03/23/2025, 03/25/2025, 3/26/25, and 03/30/2025, and 04/30/2025. The patient engages in light exercise and has not required hospitalization during these episodes of vertigo. He takes Meclizine and Zofran as needed and believes increased fluid intake has been beneficial. No signs of dehydration are reported. A weight loss of 10 pounds since the last visit is noted, which he states is due to dietary changes.   - Onset: Improvement noted recently.  - Duration: Ongoing with decreased frequency of episodes.  - Character: Gout episodes.  - Alleviating Factors: Increased fluid intake, Meclizine, and Zofran as needed.  - Severity: No hospitalization required; able to maintain work schedule.    Knee pain  The patient experiences occasional gonalgia, which he attributes to his weight.  - Onset: Occasional.  - Character: Gonalgia.  - Severity: Occasional pain attributed to weight.    Foot pain   The patient also reports a history of foot pain, which has since resolved.    Additional Information  The patient works 12-hour shifts, 4 days a week.              Allergies: Patient has no known  "allergies.     Medications:   Current Outpatient Medications:     meclizine (ANTIVERT) 25 MG Tab, Take 0.5 Tablets by mouth 3 times a day as needed for Vertigo., Disp: 90 Tablet, Rfl: 3    prochlorperazine (COMPAZINE) 10 MG Tab, Take 1 Tablet by mouth every 6 hours as needed for Nausea/Vomiting., Disp: 30 Tablet, Rfl: 3    ondansetron (ZOFRAN ODT) 4 MG TABLET DISPERSIBLE, Take 1 Tablet by mouth every 8 hours as needed for Nausea/Vomiting., Disp: 20 Tablet, Rfl: 3      ROS:  Review of Systems   Constitutional:  Negative for chills and fever.   Respiratory:  Negative for cough and shortness of breath.    Cardiovascular:  Negative for chest pain, palpitations, orthopnea and leg swelling.       Objective:     Exam:  /72   Pulse 61   Temp 36.6 °C (97.9 °F) (Temporal)   Resp 18   Ht 1.727 m (5' 8\")   Wt (!) 128 kg (281 lb 3.2 oz)   SpO2 98%   BMI 42.76 kg/m²  Body mass index is 42.76 kg/m².    Physical Exam  Constitutional:       Appearance: He is normal weight.   Eyes:      Pupils: Pupils are equal, round, and reactive to light.   Cardiovascular:      Rate and Rhythm: Normal rate and regular rhythm.      Pulses: Normal pulses.      Heart sounds: Normal heart sounds.   Pulmonary:      Effort: Pulmonary effort is normal.      Breath sounds: Normal breath sounds.   Neurological:      Mental Status: He is alert and oriented to person, place, and time.   Psychiatric:         Mood and Affect: Mood normal.         Behavior: Behavior normal.           Assessment & Plan:     Nico a 30 y.o. male with the following -     Assessment & Plan       1. Encounter for completion of form with patient  2. Vertigo  3. Benign paroxysmal positional vertigo of left ear  Chronic, stable  Recommend follow-up with physical therapy to help with vertigo.  Increase water intake to help with hydration.  Continue to use Zofran and meclizine as needed.  [facility] paperwork completed with patient.    4. Chronic pain of both knees  Acute, " uncomplicated  Reports improvement in knee pain with weight loss and increased physical activity.  Physical exam findings indicate a 10-pound weight loss since the last visit.  Discussed the impact of weight on knee pain and the importance of continued weight management.  Advised to maintain current exercise regimen and consider physical therapy if pain persists.    5. Bilateral foot pain  Acute, uncomplicated- resolved      Anticipatory guidance included the following: Patient counseled about skin care, diet, supplements, smoking, drugs/alcohol use, safe sex and exercise.     Return if symptoms worsen or fail to improve.    Please note that this dictation was created using voice recognition software. I have made every reasonable attempt to correct obvious errors, but I expect that there are errors of grammar and possibly content that I did not discover before finalizing the note.

## 2025-06-05 ENCOUNTER — APPOINTMENT (OUTPATIENT)
Dept: MEDICAL GROUP | Facility: MEDICAL CENTER | Age: 30
End: 2025-06-05
Payer: COMMERCIAL

## 2025-06-09 ENCOUNTER — OFFICE VISIT (OUTPATIENT)
Dept: URGENT CARE | Facility: PHYSICIAN GROUP | Age: 30
End: 2025-06-09
Payer: COMMERCIAL

## 2025-06-09 VITALS
WEIGHT: 289.7 LBS | SYSTOLIC BLOOD PRESSURE: 104 MMHG | HEART RATE: 67 BPM | HEIGHT: 68 IN | TEMPERATURE: 98.5 F | BODY MASS INDEX: 43.91 KG/M2 | RESPIRATION RATE: 18 BRPM | OXYGEN SATURATION: 100 % | DIASTOLIC BLOOD PRESSURE: 68 MMHG

## 2025-06-09 DIAGNOSIS — G44.209 ACUTE NON INTRACTABLE TENSION-TYPE HEADACHE: Primary | ICD-10-CM

## 2025-06-09 PROCEDURE — 99213 OFFICE O/P EST LOW 20 MIN: CPT | Performed by: STUDENT IN AN ORGANIZED HEALTH CARE EDUCATION/TRAINING PROGRAM

## 2025-06-09 PROCEDURE — 3074F SYST BP LT 130 MM HG: CPT | Performed by: STUDENT IN AN ORGANIZED HEALTH CARE EDUCATION/TRAINING PROGRAM

## 2025-06-09 PROCEDURE — 3078F DIAST BP <80 MM HG: CPT | Performed by: STUDENT IN AN ORGANIZED HEALTH CARE EDUCATION/TRAINING PROGRAM

## 2025-06-09 ASSESSMENT — ENCOUNTER SYMPTOMS
FEVER: 0
CHILLS: 0
HEADACHES: 1

## 2025-06-09 NOTE — LETTER
June 9, 2025    To Whom It May Concern:         This is confirmation that Flaviowayneana SUJATA Polanco attended his scheduled appointment with Jett Green M.D. on 6/09/25. May return to work on 6/10. If feeling improved.          If you have any questions please do not hesitate to call me at the phone number listed below.    Sincerely,          Jett Green M.D.  [phone]

## 2025-06-10 NOTE — PROGRESS NOTES
"Subjective:   Nico Polanco is a 30 y.o. male who presents for Migraine (today)      HPI:  30-year-old male who presents with headache that started today.  He has not taken any medication he just woke up with it.  His main concern is he is unable to go to work as his headache is too bad.  - He reports mild congestion and itchy throat at this time but has multiple sick contacts at work.  He denies any fevers or chills.    Review of Systems   Constitutional:  Negative for chills and fever.   HENT:  Positive for congestion.    Neurological:  Positive for headaches.       Medications:    meclizine Tabs  ondansetron Tbdp  prochlorperazine Tabs    Allergies: Patient has no known allergies.    Problem List: Nico Polanco does not have any pertinent problems on file.    Surgical History:  No past surgical history on file.    Past Social Hx: Nico Polanco  reports that he has been smoking cigarettes. He started smoking about 2 years ago. He has a 1.2 pack-year smoking history. His smokeless tobacco use includes chew. He reports that he does not currently use alcohol. He reports that he does not currently use drugs.     Past Family Hx:  Nico Polanco family history includes Asthma in his mother and paternal grandmother; Diabetes in his paternal grandmother; Hypertension in his paternal grandfather and paternal grandmother; No Known Problems in his brother, father, and sister.     Problem list, medications, and allergies reviewed by myself today in Epic.     Objective:     /68   Pulse 67   Temp 36.9 °C (98.5 °F) (Temporal)   Resp 18   Ht 1.727 m (5' 8\")   Wt (!) 131 kg (289 lb 11.2 oz)   SpO2 100%   BMI 44.05 kg/m²     Physical Exam  Constitutional:       Appearance: Normal appearance.   HENT:      Head: Normocephalic and atraumatic.      Right Ear: Tympanic membrane normal.      Left Ear: Tympanic membrane normal.      Nose: Nose normal. No congestion or rhinorrhea.      Mouth/Throat:      " Pharynx: No oropharyngeal exudate or posterior oropharyngeal erythema.   Eyes:      Extraocular Movements: Extraocular movements intact.      Conjunctiva/sclera: Conjunctivae normal.   Cardiovascular:      Rate and Rhythm: Normal rate and regular rhythm.      Pulses: Normal pulses.      Heart sounds: Normal heart sounds.   Pulmonary:      Effort: Pulmonary effort is normal.      Breath sounds: Normal breath sounds.   Neurological:      General: No focal deficit present.      Mental Status: He is alert and oriented to person, place, and time.         Assessment/Plan:     Diagnosis and associated orders:     1. Acute non intractable tension-type headache           Comments/MDM:     1. Acute non intractable tension-type headache (Primary)  I discussed with patient that he likely might be getting ill and it could be the cause of his headache given his mild congestion and mild sore throat at this time.  Offered strep and flu testing but patient declined.  - I discussed symptomatic management for the headache including NSAIDs and Tylenol or Excedrin.  I discussed aggressive hydration with electrolyte containing fluid.  - I offered Toradol for the patient's headache at this time but patient declined.  - No provided for work.           Differential diagnosis, natural history, supportive care, and indications for immediate follow-up discussed.    Advised the patient to follow-up with the primary care physician for recheck, reevaluation, and consideration of further management.    Please note that this dictation was created using voice recognition software. I have made a reasonable attempt to correct obvious errors, but I expect that there are errors of grammar and possibly content that I did not discover before finalizing the note.    Jett Green M.D.

## 2025-06-13 ENCOUNTER — TELEPHONE (OUTPATIENT)
Dept: MEDICAL GROUP | Facility: MEDICAL CENTER | Age: 30
End: 2025-06-13
Payer: COMMERCIAL

## 2025-06-13 NOTE — TELEPHONE ENCOUNTER
Reached out and left a voice message regarding most recent no show with Gertrudeyaneli Neri on 6/12/25. I would like to discuss the no show policy and help get rescheduled. Patient must speak with  to schedule with PCP.